# Patient Record
Sex: MALE | Race: WHITE | HISPANIC OR LATINO | URBAN - METROPOLITAN AREA
[De-identification: names, ages, dates, MRNs, and addresses within clinical notes are randomized per-mention and may not be internally consistent; named-entity substitution may affect disease eponyms.]

---

## 2018-09-09 ENCOUNTER — EMERGENCY (EMERGENCY)
Facility: HOSPITAL | Age: 23
LOS: 1 days | Discharge: HOME | End: 2018-09-09
Attending: EMERGENCY MEDICINE | Admitting: EMERGENCY MEDICINE
Payer: COMMERCIAL

## 2018-09-09 VITALS
RESPIRATION RATE: 18 BRPM | OXYGEN SATURATION: 98 % | HEART RATE: 70 BPM | HEIGHT: 65 IN | TEMPERATURE: 99 F | DIASTOLIC BLOOD PRESSURE: 66 MMHG | WEIGHT: 195.11 LBS | SYSTOLIC BLOOD PRESSURE: 113 MMHG

## 2018-09-09 PROCEDURE — 99283 EMERGENCY DEPT VISIT LOW MDM: CPT

## 2018-09-09 PROCEDURE — 99284 EMERGENCY DEPT VISIT MOD MDM: CPT

## 2018-09-09 NOTE — ED STATDOCS - MEDICAL DECISION MAKING DETAILS
22 year old panic attack  family service league and community resources given   does not want to speak to BH does not want medication

## 2018-09-09 NOTE — ED ADULT NURSE NOTE - NSIMPLEMENTINTERV_GEN_ALL_ED
Implemented All Universal Safety Interventions:  [place] to call system. Call bell, personal items and telephone within reach. Instruct patient to call for assistance. Room bathroom lighting operational. Non-slip footwear when patient is off stretcher. Physically safe environment: no spills, clutter or unnecessary equipment. Stretcher in lowest position, wheels locked, appropriate side rails in place.

## 2018-09-09 NOTE — ED STATDOCS - OBJECTIVE STATEMENT
22 year old male no psychiatric hx, presenting with 2 episodes of panic attacks over the last 2 days. states that he has been extremely stressed about money lately. 5pm today while driving he state that he felt like a panic attack was coming on, started sweating, got very anxious, felt short of breath and pulled over. states that the episode lasted 20 mins. denies SI/HI. denies drinking smoking or illicit drug use. states that currently he is feeling a lot better than before but is still worried that he might have another attack. states that he has had palpitations in the past and has follwoed up with a cardiologist. denies current cp. sob. nausea, ha, blurry vision, abdominal pains

## 2018-09-09 NOTE — ED STATDOCS - ATTENDING CONTRIBUTION TO CARE
22 year old male no psychiatric hx, presenting with 2 episodes of panic attacks over the last 2 days. states that he has been extremely stressed about money lately. 5pm today while driving he state that he felt like a panic attack was coming on, started sweating, got very anxious, felt short of breath and pulled over. states that the episode lasted 20 mins. denies SI/HI. denies drinking smoking or illicit drug use.  pe nad  heent ncat neuro nonfocal psych ; no si, no hallucinations; clear speech; dc with family dx anxiety;

## 2018-09-09 NOTE — ED ADULT TRIAGE NOTE - CHIEF COMPLAINT QUOTE
Patient is awake and oriented times 3, complains of an episode of feeling anxious on his way to his grandmothers house, patient reports that he is slowly feeling better

## 2019-01-10 ENCOUNTER — EMERGENCY (EMERGENCY)
Facility: HOSPITAL | Age: 24
LOS: 1 days | Discharge: HOME | End: 2019-01-10
Attending: EMERGENCY MEDICINE
Payer: MEDICAID

## 2019-01-10 VITALS
HEART RATE: 78 BPM | DIASTOLIC BLOOD PRESSURE: 81 MMHG | RESPIRATION RATE: 18 BRPM | HEIGHT: 66 IN | TEMPERATURE: 98 F | WEIGHT: 195.11 LBS | OXYGEN SATURATION: 99 % | SYSTOLIC BLOOD PRESSURE: 132 MMHG

## 2019-01-10 VITALS
SYSTOLIC BLOOD PRESSURE: 128 MMHG | RESPIRATION RATE: 16 BRPM | DIASTOLIC BLOOD PRESSURE: 75 MMHG | OXYGEN SATURATION: 99 % | HEART RATE: 64 BPM

## 2019-01-10 LAB
ALBUMIN SERPL ELPH-MCNC: 4.6 G/DL — SIGNIFICANT CHANGE UP (ref 3.3–5.2)
ALP SERPL-CCNC: 86 U/L — SIGNIFICANT CHANGE UP (ref 40–120)
ALT FLD-CCNC: 17 U/L — SIGNIFICANT CHANGE UP
ANION GAP SERPL CALC-SCNC: 11 MMOL/L — SIGNIFICANT CHANGE UP (ref 5–17)
APTT BLD: 31 SEC — SIGNIFICANT CHANGE UP (ref 27.5–36.3)
AST SERPL-CCNC: 19 U/L — SIGNIFICANT CHANGE UP
BASOPHILS # BLD AUTO: 0 K/UL — SIGNIFICANT CHANGE UP (ref 0–0.2)
BASOPHILS NFR BLD AUTO: 0.3 % — SIGNIFICANT CHANGE UP (ref 0–2)
BILIRUB SERPL-MCNC: 0.6 MG/DL — SIGNIFICANT CHANGE UP (ref 0.4–2)
BUN SERPL-MCNC: 14 MG/DL — SIGNIFICANT CHANGE UP (ref 8–20)
CALCIUM SERPL-MCNC: 9.4 MG/DL — SIGNIFICANT CHANGE UP (ref 8.6–10.2)
CHLORIDE SERPL-SCNC: 99 MMOL/L — SIGNIFICANT CHANGE UP (ref 98–107)
CK MB CFR SERPL CALC: 2.7 NG/ML — SIGNIFICANT CHANGE UP (ref 0–6.7)
CK SERPL-CCNC: 159 U/L — SIGNIFICANT CHANGE UP (ref 30–200)
CO2 SERPL-SCNC: 25 MMOL/L — SIGNIFICANT CHANGE UP (ref 22–29)
CREAT SERPL-MCNC: 0.58 MG/DL — SIGNIFICANT CHANGE UP (ref 0.5–1.3)
EOSINOPHIL # BLD AUTO: 0.1 K/UL — SIGNIFICANT CHANGE UP (ref 0–0.5)
EOSINOPHIL NFR BLD AUTO: 1.3 % — SIGNIFICANT CHANGE UP (ref 0–5)
GLUCOSE SERPL-MCNC: 101 MG/DL — SIGNIFICANT CHANGE UP (ref 70–115)
HCT VFR BLD CALC: 44.2 % — SIGNIFICANT CHANGE UP (ref 42–52)
HGB BLD-MCNC: 14.8 G/DL — SIGNIFICANT CHANGE UP (ref 14–18)
INR BLD: 1.08 RATIO — SIGNIFICANT CHANGE UP (ref 0.88–1.16)
LIDOCAIN IGE QN: 16 U/L — LOW (ref 22–51)
LYMPHOCYTES # BLD AUTO: 2.2 K/UL — SIGNIFICANT CHANGE UP (ref 1–4.8)
LYMPHOCYTES # BLD AUTO: 25.2 % — SIGNIFICANT CHANGE UP (ref 20–55)
MCHC RBC-ENTMCNC: 27.4 PG — SIGNIFICANT CHANGE UP (ref 27–31)
MCHC RBC-ENTMCNC: 33.5 G/DL — SIGNIFICANT CHANGE UP (ref 32–36)
MCV RBC AUTO: 81.7 FL — SIGNIFICANT CHANGE UP (ref 80–94)
MONOCYTES # BLD AUTO: 0.7 K/UL — SIGNIFICANT CHANGE UP (ref 0–0.8)
MONOCYTES NFR BLD AUTO: 7.9 % — SIGNIFICANT CHANGE UP (ref 3–10)
NEUTROPHILS # BLD AUTO: 5.6 K/UL — SIGNIFICANT CHANGE UP (ref 1.8–8)
NEUTROPHILS NFR BLD AUTO: 64.7 % — SIGNIFICANT CHANGE UP (ref 37–73)
PLATELET # BLD AUTO: 356 K/UL — SIGNIFICANT CHANGE UP (ref 150–400)
POTASSIUM SERPL-MCNC: 4.1 MMOL/L — SIGNIFICANT CHANGE UP (ref 3.5–5.3)
POTASSIUM SERPL-SCNC: 4.1 MMOL/L — SIGNIFICANT CHANGE UP (ref 3.5–5.3)
PROT SERPL-MCNC: 7.8 G/DL — SIGNIFICANT CHANGE UP (ref 6.6–8.7)
PROTHROM AB SERPL-ACNC: 12.4 SEC — SIGNIFICANT CHANGE UP (ref 10–12.9)
RBC # BLD: 5.41 M/UL — SIGNIFICANT CHANGE UP (ref 4.6–6.2)
RBC # FLD: 13.3 % — SIGNIFICANT CHANGE UP (ref 11–15.6)
SODIUM SERPL-SCNC: 135 MMOL/L — SIGNIFICANT CHANGE UP (ref 135–145)
TROPONIN T SERPL-MCNC: <0.01 NG/ML — SIGNIFICANT CHANGE UP (ref 0–0.06)
TSH SERPL-MCNC: 2.41 UIU/ML — SIGNIFICANT CHANGE UP (ref 0.27–4.2)
WBC # BLD: 8.6 K/UL — SIGNIFICANT CHANGE UP (ref 4.8–10.8)
WBC # FLD AUTO: 8.6 K/UL — SIGNIFICANT CHANGE UP (ref 4.8–10.8)

## 2019-01-10 PROCEDURE — 71046 X-RAY EXAM CHEST 2 VIEWS: CPT

## 2019-01-10 PROCEDURE — 99284 EMERGENCY DEPT VISIT MOD MDM: CPT | Mod: 25

## 2019-01-10 PROCEDURE — 84484 ASSAY OF TROPONIN QUANT: CPT

## 2019-01-10 PROCEDURE — 82550 ASSAY OF CK (CPK): CPT

## 2019-01-10 PROCEDURE — 85027 COMPLETE CBC AUTOMATED: CPT

## 2019-01-10 PROCEDURE — 84443 ASSAY THYROID STIM HORMONE: CPT

## 2019-01-10 PROCEDURE — 99284 EMERGENCY DEPT VISIT MOD MDM: CPT

## 2019-01-10 PROCEDURE — 80053 COMPREHEN METABOLIC PANEL: CPT

## 2019-01-10 PROCEDURE — 82553 CREATINE MB FRACTION: CPT

## 2019-01-10 PROCEDURE — 96374 THER/PROPH/DIAG INJ IV PUSH: CPT

## 2019-01-10 PROCEDURE — 71046 X-RAY EXAM CHEST 2 VIEWS: CPT | Mod: 26

## 2019-01-10 PROCEDURE — 83690 ASSAY OF LIPASE: CPT

## 2019-01-10 PROCEDURE — 85730 THROMBOPLASTIN TIME PARTIAL: CPT

## 2019-01-10 PROCEDURE — 36415 COLL VENOUS BLD VENIPUNCTURE: CPT

## 2019-01-10 PROCEDURE — 85610 PROTHROMBIN TIME: CPT

## 2019-01-10 RX ORDER — ONDANSETRON 8 MG/1
4 TABLET, FILM COATED ORAL ONCE
Qty: 0 | Refills: 0 | Status: COMPLETED | OUTPATIENT
Start: 2019-01-10 | End: 2019-01-10

## 2019-01-10 RX ORDER — SODIUM CHLORIDE 9 MG/ML
1000 INJECTION INTRAMUSCULAR; INTRAVENOUS; SUBCUTANEOUS ONCE
Qty: 0 | Refills: 0 | Status: COMPLETED | OUTPATIENT
Start: 2019-01-10 | End: 2019-01-10

## 2019-01-10 RX ADMIN — ONDANSETRON 4 MILLIGRAM(S): 8 TABLET, FILM COATED ORAL at 14:37

## 2019-01-10 RX ADMIN — SODIUM CHLORIDE 1000 MILLILITER(S): 9 INJECTION INTRAMUSCULAR; INTRAVENOUS; SUBCUTANEOUS at 14:38

## 2019-01-10 NOTE — ED STATDOCS - PROGRESS NOTE DETAILS
NP NOTE:  HPI, ROS, PE of intake doctor reviewed and agree. W/U pending. NP NOTE:  Labs reviewed, unremarkable.  PVC's occasionally on monitor.  Will f/u with Dr. Star Branch.  Patient offered psych consult for anxiety, he  stated he would f/u with PCP and ask him.

## 2019-01-10 NOTE — ED STATDOCS - MEDICAL DECISION MAKING DETAILS
Will check basic lab work, CXR and if cleared will dc home with cardiology followup. Pt here with episode of palpitations/ body aches and nausea present upon awakening and since resolved.  Taking herbal supplement containing passionflower which has side effects listed including nausea/ palpitations listed .  Pt advised to halt supplementation.  EKG WNL. Will check basic lab work, including electrolytes, CXR, cardiac monitor,  and if cleared will dc home with cardiology followup.

## 2019-01-10 NOTE — ED ADULT NURSE NOTE - OBJECTIVE STATEMENT
pt awake, alert and oriented x3 c/o feelings of chest palpitations and anxiety.  Respirations even and unlabored, denies chest pain.  Abdomen soft, nontender, nondistended.  Skin warm and dry.  Moving all extremities well and with purpose.

## 2019-01-10 NOTE — ED STATDOCS - OBJECTIVE STATEMENT
22 y/o M with PMHx of anxiety presents to ED c/o palpitations onset today around 1130 today upon waking up with associated body aches. The episode lasted approximately 45 minutes and resolved shortly after coming to the ED and currently only feels nauseous. He mentions that last night he was experiencing  cough and nasal congestion. Has experienced palpitations in the past (2x) but was never evaluated by a doctor and past episodes were more severe that what he felt today. Admits to occasional marijuana use. Unsure if he has family hx of cardiac problems. Has been drinking Nervi-sharon BID (a natural product) for his anxiety. Denies fevers, chills or vomiting. Has been evaluated by a cardiologist 2 years ago and had a halter monitor test which was normal. No further acute complaints at this time.    Cardiologist: Dr. Star Branch 22 y/o M with PMHx of anxiety presents to ED c/o palpitations onset today around 1130 today upon waking up with associated body aches. The episode lasted approximately 45 minutes and resolved shortly after coming to the ED and currently only feels nauseous. He mentions that last night he was experiencing  cough and nasal congestion. Has experienced palpitations in the past (2x) but was never evaluated by a doctor and past episodes were more severe that what he felt today. Admits to occasional marijuana use. Unsure if he has family hx of cardiac problems. Has been drinking Nervi-sharon BID (a natural product) for his anxiety. Denies fevers, chills or vomiting. Has been evaluated by a cardiologist 2 years ago for these same symptoms and had a halter monitor test which was normal. No further acute complaints at this time. Pt does not want to see a psychiatrist regarding anxiety because he states that if he were given a diagnosis he wouldn't believe it anyway.     Cardiologist: Dr. Star Branch

## 2019-01-10 NOTE — ED STATDOCS - ATTENDING CONTRIBUTION TO CARE
I, Jany Elias, performed the initial face to face bedside interview with this patient regarding history of present illness, review of symptoms and relevant past medical, social and family history.  I completed an independent physical examination.  I was the initial provider who evaluated this patient. I have signed out the follow up of any pending tests (i.e. labs, radiological studies) to the ACP.  I have communicated the patient’s plan of care and disposition with the ACP.  The history, relevant review of systems, past medical and surgical history, medical decision making, and physical examination was documented by the scribe in my presence and I attest to the accuracy of the documentation.

## 2019-01-10 NOTE — ED ADULT TRIAGE NOTE - CHIEF COMPLAINT QUOTE
pt states he had sudden onset difficulty breathing and felt like his heart was racing.  currently symptoms have resolved.

## 2019-01-19 ENCOUNTER — EMERGENCY (EMERGENCY)
Facility: HOSPITAL | Age: 24
LOS: 1 days | Discharge: HOME | End: 2019-01-19
Attending: EMERGENCY MEDICINE
Payer: MEDICAID

## 2019-01-19 VITALS
OXYGEN SATURATION: 99 % | WEIGHT: 194.01 LBS | HEART RATE: 75 BPM | RESPIRATION RATE: 18 BRPM | TEMPERATURE: 99 F | HEIGHT: 66 IN | DIASTOLIC BLOOD PRESSURE: 73 MMHG | SYSTOLIC BLOOD PRESSURE: 130 MMHG

## 2019-01-19 PROCEDURE — 99282 EMERGENCY DEPT VISIT SF MDM: CPT

## 2019-01-19 PROCEDURE — 99283 EMERGENCY DEPT VISIT LOW MDM: CPT | Mod: 25

## 2019-01-20 NOTE — ED PROVIDER NOTE - OBJECTIVE STATEMENT
22 y/o male hx anxiety c/o some pain in distal left arm that turned to numbness. States has had intermittent pains on left side of body for a while, coinciding with start of his anxiety. Developed paresthesia in hand only over past day. Seen here last week for palpitations, neg labs/xr. Pt states no stressors at home but has just felt a lot more anxious/with panic attacks over past months. No headache, f/c, cp, sob, abd pain or other symptoms. Pt also reports small bumps under armpit on left for 1 month, non tender, no f/c.     ROS: No fever/chills. No eye pain/changes in vision, No ear pain/sore throat/dysphagia, No chest pain/palpitations. No SOB/cough/. No abdominal pain, N/V/D, no black/bloody bm. No dysuria/frequency/discharge, No headache. No Dizziness.    No rashes or breaks in skin.

## 2019-01-20 NOTE — ED PROVIDER NOTE - PHYSICAL EXAMINATION
Gen: No acute distress, non toxic, anxious/stressed appearing.   HEENT: Mucous membranes moist, pink conjunctivae, EOMI  CV: RRR, nl s1/s2.  Resp: CTAB, normal rate and effort  GI: Abdomen soft, NT, ND. No rebound, no guarding  : No CVAT  Neuro: A&O x 3, moving all 4 extremities. cn 2-12 wnl. nl sensation entire b/l UE and LE. 5/5 strength at each joint. nl thumb opposition, finger adduction, extension. no sandra tenderness or swelling.   MSK: No spine or joint tenderness to palpation  Skin: No rashes. intact and perfused. 2 ~5mm non tender palpable nodules left axilla, no overlying skin changes.

## 2019-01-20 NOTE — ED PROVIDER NOTE - MEDICAL DECISION MAKING DETAILS
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 neuro completely wnl. ?small non tender lymph node in axilla. no infectious, nl labs last week. has pcp. encouraged BCT/therapy. return precautions.

## 2019-02-20 ENCOUNTER — EMERGENCY (EMERGENCY)
Facility: HOSPITAL | Age: 24
LOS: 1 days | Discharge: HOME | End: 2019-02-20
Attending: EMERGENCY MEDICINE
Payer: MEDICAID

## 2019-02-20 VITALS
TEMPERATURE: 98 F | HEART RATE: 72 BPM | HEIGHT: 65 IN | RESPIRATION RATE: 18 BRPM | DIASTOLIC BLOOD PRESSURE: 100 MMHG | SYSTOLIC BLOOD PRESSURE: 145 MMHG | OXYGEN SATURATION: 99 % | WEIGHT: 188.94 LBS

## 2019-02-20 PROCEDURE — 99283 EMERGENCY DEPT VISIT LOW MDM: CPT | Mod: 25

## 2019-02-20 PROCEDURE — 99282 EMERGENCY DEPT VISIT SF MDM: CPT

## 2019-02-20 RX ORDER — DEXAMETHASONE 0.5 MG/5ML
10 ELIXIR ORAL ONCE
Qty: 0 | Refills: 0 | Status: DISCONTINUED | OUTPATIENT
Start: 2019-02-20 | End: 2019-02-25

## 2019-02-20 NOTE — ED PROVIDER NOTE - OBJECTIVE STATEMENT
pt presents with odynophagia x 1 week no drooling no trisums and intermittent left sided achy chest pain no prior tx. . denies fever. denies HA or neck pain. no chest pain or sob. no abd pain. no n/v/d. no urinary f/u/d. no back pain. no motor or sensory deficits. denies illicit drug use. no recent travel. no rash. no other acute issues symptoms or concerns

## 2019-02-20 NOTE — ED PROVIDER NOTE - ENMT, MLM
Airway patent, Nasal mucosa clear. Mouth with normal mucosa. Throat has no vesicles, no oropharyngeal exudates and uvula is midline. no pta

## 2019-03-09 ENCOUNTER — EMERGENCY (EMERGENCY)
Facility: HOSPITAL | Age: 24
LOS: 1 days | Discharge: HOME | End: 2019-03-09
Attending: EMERGENCY MEDICINE
Payer: MEDICAID

## 2019-03-09 VITALS
RESPIRATION RATE: 20 BRPM | OXYGEN SATURATION: 97 % | HEART RATE: 65 BPM | HEIGHT: 66 IN | TEMPERATURE: 98 F | DIASTOLIC BLOOD PRESSURE: 68 MMHG | SYSTOLIC BLOOD PRESSURE: 133 MMHG | WEIGHT: 182.1 LBS

## 2019-03-09 PROCEDURE — 99282 EMERGENCY DEPT VISIT SF MDM: CPT | Mod: 25

## 2019-03-09 PROCEDURE — 99283 EMERGENCY DEPT VISIT LOW MDM: CPT | Mod: 25

## 2019-03-09 PROCEDURE — 69210 REMOVE IMPACTED EAR WAX UNI: CPT

## 2019-03-09 PROCEDURE — 69210 REMOVE IMPACTED EAR WAX UNI: CPT | Mod: LT,RT

## 2019-03-09 NOTE — ED STATDOCS - CLINICAL SUMMARY MEDICAL DECISION MAKING FREE TEXT BOX
pt with cerumen impaction, disimpaction, hearing resolved, pain relieved, pt DCed and pt advised to follow up with audiologist or ENT.

## 2019-03-09 NOTE — ED STATDOCS - ENMT, MLM
Nasal mucosa clear.  Mouth with normal mucosa.  Impacted cerumen b/l, L greater than R.  Throat has no vesicles, no oropharyngeal exudates and uvula is midline.

## 2019-03-09 NOTE — ED STATDOCS - OBJECTIVE STATEMENT
22 y/o M pt presents to the ED c/o persistent L ear pain beginning 3 days ago.  Pt put ear drops in both ears, notes worsening L ear pain and decreased hearing to his R ear.  He notes associated nausea and HA.  Denies fever, chills, vomiting, CP, SOB.  No further acute complaints at this time. 24 y/o M pt presents to the ED c/o persistent L ear pain beginning 3 days ago.  Pt put ear drops in both ears, notes worsening L ear pain and decreased hearing to his b/l ears.  He notes associated nausea and HA.  Pt states this has happened to him before, he has had to have his ears flushed and cleaned out.  Denies fever, chills, vomiting, CP, SOB.  No further acute complaints at this time.

## 2020-09-16 NOTE — ED ADULT NURSE NOTE - BREATH SOUNDS, MLM
Para completed, pt tolerated well. No apparent distress noted. 5.6 Liters removed from left abd, mepore applied CDI. Labs collected and sent. Albumin 150 ml given per protocol.  Discharge instructions reviewed and acknowledged. Pt discharged via [employer]cgair and private vehicle.     
Pt to rm 125. M [place] NP to bedside. Yueh Catheter placed to LLW with use of ultrasound guidance. No complaints or signs of distress. Will continue to monitor.    
Clear
62.7

## 2020-11-03 ENCOUNTER — EMERGENCY (EMERGENCY)
Facility: HOSPITAL | Age: 25
LOS: 1 days | Discharge: HOME | End: 2020-11-03
Attending: EMERGENCY MEDICINE
Payer: MEDICAID

## 2020-11-03 VITALS
OXYGEN SATURATION: 98 % | RESPIRATION RATE: 18 BRPM | SYSTOLIC BLOOD PRESSURE: 126 MMHG | TEMPERATURE: 99 F | HEART RATE: 77 BPM | HEIGHT: 66 IN | DIASTOLIC BLOOD PRESSURE: 76 MMHG | WEIGHT: 199.96 LBS

## 2020-11-03 PROCEDURE — 99283 EMERGENCY DEPT VISIT LOW MDM: CPT

## 2020-11-03 PROCEDURE — 73630 X-RAY EXAM OF FOOT: CPT

## 2020-11-03 PROCEDURE — 73630 X-RAY EXAM OF FOOT: CPT | Mod: 26,RT

## 2020-11-03 NOTE — ED PROVIDER NOTE - PATIENT PORTAL LINK FT
You can access the FollowMyHealth Patient Portal offered by [facility] by registering at the following website: [email or web address]. By joining [employer]’s FollowMyHealth portal, you will also be able to view your health information using other applications (apps) compatible with our system.

## 2020-11-03 NOTE — ED PROVIDER NOTE - PROGRESS NOTE DETAILS
CHANG Ambrocio NOTE: Pt evaluated at bedside. Pt is 26 y/o M with right lateral foot pain after twisting injury occurring 2 days ago while at gym. Denies head trauma or fall down.   PE: non toxic, RRR, lungs CTA, right lateral midfoot ttp, no malleolus ttp, NVI, able to bear wt, cap refill less than 2 seconds, no ttp knee.   Pt evaluated prior by intake physician. Otherwise HPI/PE/ROS as noted above. Will follow up plan per intake physician.

## 2020-11-03 NOTE — ED PROVIDER NOTE - CARE PROVIDER_API CALL
Francesca De La Torre  PODIATRIC MEDICINE AND SURGERY  [address] [place] [address]  Phone: [phone]  Fax: [phone]  Follow Up Time:

## 2020-11-30 ENCOUNTER — EMERGENCY (EMERGENCY)
Facility: HOSPITAL | Age: 25
LOS: 1 days | Discharge: HOME | End: 2020-11-30
Attending: EMERGENCY MEDICINE
Payer: MEDICAID

## 2020-11-30 VITALS
TEMPERATURE: 99 F | WEIGHT: 207.01 LBS | HEART RATE: 76 BPM | RESPIRATION RATE: 18 BRPM | DIASTOLIC BLOOD PRESSURE: 77 MMHG | SYSTOLIC BLOOD PRESSURE: 115 MMHG | OXYGEN SATURATION: 98 % | HEIGHT: 66 IN

## 2020-11-30 PROCEDURE — 99283 EMERGENCY DEPT VISIT LOW MDM: CPT | Mod: 25

## 2020-11-30 PROCEDURE — 69210 REMOVE IMPACTED EAR WAX UNI: CPT | Mod: LT,RT

## 2020-11-30 PROCEDURE — 69210 REMOVE IMPACTED EAR WAX UNI: CPT

## 2020-11-30 RX ORDER — CIPROFLOXACIN 6 MG/ML
1 SUSPENSION INTRATYMPANIC
Qty: 30 | Refills: 0
Start: 2020-11-30 | End: 2020-12-09

## 2020-11-30 NOTE — ED STATDOCS - PROGRESS NOTE DETAILS
Ears irrigated by CHANG Negron, stable for dc home with ENT f/u. Maritza Rain DO Irrigated ears, wax was removed, TM still partially occluded with was. Pt informed and understands to follow up with ENT

## 2020-11-30 NOTE — ED STATDOCS - ATTENDING CONTRIBUTION TO CARE
I, Maritza Rain, performed a face to face bedside interview with this patient regarding history of present illness, review of symptoms and relevant past medical, social and family history.  I completed an independent physical examination. Medical decision making, follow-up on ordered tests (ie labs, radiologic studies) and re-evaluation of the patient's status has been communicated to the ACP.  Disposition of the patient will be based on test outcome and response to ED interventions.

## 2020-11-30 NOTE — ED STATDOCS - CARE PROVIDER_API CALL
Lalo Hardwick  OTOLARYNGOLOGY  [address] [place] [address], [place] [address]  Phone: [phone]  Fax: [phone]  Follow Up Time: 4-6 Days

## 2020-11-30 NOTE — ED STATDOCS - PHYSICAL EXAMINATION
Gen: NAD, AOx3  Head: NCAT  HEENT: L auditory canal with cerumen impaction, R auditory canal erythematous, some cerumen present, R TM intact, no TTP over mastaoid process on right, oropharynx clear without exudate or erythema, no nuchal rigidity, normal voice, normal conjunctiva  Lung: no respiratory distress  CV: Normal perfusion  MSK: No edema, no visible deformities, full range of motion in all 4 extremities  Neuro:No focal neurologic deficits  Skin: No rash   Psych: normal affect

## 2020-11-30 NOTE — ED STATDOCS - CLINICAL SUMMARY MEDICAL DECISION MAKING FREE TEXT BOX
Pt with R otitis externa, cerumen impaction- will prescribe topical otic antibiotics, dc home with ENT f/u.

## 2020-11-30 NOTE — ED STATDOCS - OBJECTIVE STATEMENT
26yo male with no PMH presenting with R ear fullness x 1 day, R ear pain x today. Patient states he thinks his ears are clogged, has been picking at ear with finger and put peroxide in ear today. No hearing loss. No fevers/chills. Denies placing any other FB to R ear. +Sore throat, no nasal congestion, no cough.

## 2020-11-30 NOTE — ED STATDOCS - NSFOLLOWUPINSTRUCTIONS_ED_ALL_ED_FT
1. Follow up with an ENT within 2-3days for reevaluation.  2.  Return to the Emergency Department for worsening, progressive or any other concerning symptoms.   3.  Complete your entire course of antibiotics as prescribed. Do not stop taking antibiotics even if your symptoms improve.   4.  Please take Motrin 600mg by mouth every 6 hours as needed for pain. Please take this medication with food.       Otitis Externa    AMBULATORY CARE:    Otitis externa, or swimmer's ear, is an infection in the outer ear canal. This canal goes from the outside of the ear to the eardrum.     Ear Anatomy         Common signs and symptoms include the following:   •Ear pain      •Outer ear canal is red and swollen      •Clear fluid or pus is leaking out of your ear      •Outer ear canal is itchy and you see a rash      •Trouble hearing because your ear is plugged      •Feel a bump in your ear canal, called a polyp      •Flakes of skin fall from your ear      Seek care immediately if:   •You have severe ear pain.      •You are suddenly unable to hear at all.      •You have new swelling in your face, behind your ears, or in your neck.      •You suddenly cannot move part of your face.      •Your face suddenly feels numb.      Contact your healthcare provider if:   •You have a fever.      •Your signs and symptoms do not get better after 2 days of treatment.       •Your signs and symptoms go away for a time, but then come back.       •You have questions or concerns about your condition or care.      Treatment for otitis externa may include any of the following:  •NSAIDs, such as ibuprofen, help decrease swelling, pain, and fever. This medicine is available with or without a doctor's order. NSAIDs can cause stomach bleeding or kidney problems in certain people. If you take blood thinner medicine, always ask if NSAIDs are safe for you. Always read the medicine label and follow directions. Do not give these medicines to children under 6 months of age without direction from your child's healthcare provider.      •Acetaminophen decreases pain and fever. It is available without a doctor's order. Ask how much to take and how often to take it. Follow directions. Acetaminophen can cause liver damage if not taken correctly.      •Ear drops that contain an antibiotic may be given. The antibiotic helps treat a bacterial infection. You may also be given steroid medicine. The steroid helps decrease redness, swelling, and pain.       •Ear wicking removes fluid or wax from your outer ear canal. Healthcare providers may insert a small tube, called a wick, into your ear to help drain fluid. A wick also may be used to put medicine into your ear canal if the canal is blocked.      Follow the steps below to use eardrops:   •Lie down on your side with your infected ear facing up.      •Carefully drip the correct number of eardrops into your ear. Have another person help you if possible.      •Gently move the outside part of your ear back and forth to help the medicine reach your ear canal.       •Stay lying down in the same position (with your ear facing up) for 3 to 5 minutes.       Prevent otitis externa:   •Do not put cotton swabs or foreign objects in your ears.      •Wrap a clean moist washcloth around your finger, and use it to clean your outer ear and remove extra ear wax.       •Use ear plugs when you swim. Dry your outer ears completely after you swim or bathe.      Follow up with your healthcare provider as directed: Write down your questions so you remember to ask them during your visits.

## 2020-11-30 NOTE — ED STATDOCS - PATIENT PORTAL LINK FT
You can access the FollowMyHealth Patient Portal offered by [facility] by registering at the following website: [email or web address]. By joining [employer]’s FollowMyHealth portal, you will also be able to view your health information using other applications (apps) compatible with our system.

## 2020-12-04 NOTE — ED PROCEDURE NOTE - PROCEDURE ADDITIONAL DETAILS
used a large syringe with 18 guage catheter, irrigated with warm saline. Wax was successfully removed. Pt tolerated well. No dizziness or complaints used a large syringe with 18 guage catheter, irrigated with warm saline. Wax was successfully removed. Pt tolerated well. No dizziness or complaints    bilateral ears irrigated

## 2020-12-28 ENCOUNTER — EMERGENCY (EMERGENCY)
Facility: HOSPITAL | Age: 25
LOS: 1 days | Discharge: HOME | End: 2020-12-28
Attending: EMERGENCY MEDICINE
Payer: MEDICAID

## 2020-12-28 VITALS
RESPIRATION RATE: 18 BRPM | SYSTOLIC BLOOD PRESSURE: 132 MMHG | DIASTOLIC BLOOD PRESSURE: 87 MMHG | HEART RATE: 80 BPM | WEIGHT: 203.05 LBS | OXYGEN SATURATION: 97 % | TEMPERATURE: 98 F | HEIGHT: 66 IN

## 2020-12-28 PROCEDURE — 99283 EMERGENCY DEPT VISIT LOW MDM: CPT

## 2020-12-28 RX ADMIN — Medication 1 TABLET(S): at 07:52

## 2020-12-28 NOTE — ED PROVIDER NOTE - NSFOLLOWUPINSTRUCTIONS_ED_ALL_ED_FT
return to ED with any vomiting, visual changes, worsening symptoms or any new concerns.     Apply warm compresses to area 3 times a day. Do not pick at area. Clean area daily with soap and water.     Take Augmentin 875mg twice a day x 7 days.     Follow up with [facility] clinic within  48 hours

## 2020-12-28 NOTE — ED PROVIDER NOTE - ATTENDING CONTRIBUTION TO CARE
Sola BLACKWELL- 26 y/o M with no med problems p/w pimple of tip of nose and under for 3 days and he picked on it and it got worse and painful. No nasal drainage. Pt also has throat pain and slight headache but no nausea, vomiting. Pt has been eating, drinking, urinating, defecating wnl. No changes in vision or rash anywhere else    Pt is alert, well appearing male, s1s2 normal reg, b/l clear breath sounds, abd soft, nt, nd, neuro exam aox3, cn 2-12 intact, no focal deficits, oral exam normal, no exudates or redness of throat, tip of nose is red with one pimple but no fluctuance, mild tenderness, nor interseptal pain or swelling, neuro exam aox3, cn 2-12 intact, no focal deficits, neck soft, supple, skin warm, dry, good turgor    will treat with Augmentin, advised ot apply warm compress to nose 2-3 times a day and take steam inhalation

## 2020-12-28 NOTE — ED PROVIDER NOTE - PATIENT PORTAL LINK FT
You can access the FollowMyHealth Patient Portal offered by [facility] by registering at the following website: [email or web address]. By joining [employer]’s FollowMyHealth portal, you will also be able to view your health information using other applications (apps) compatible with our system.

## 2020-12-28 NOTE — ED PROVIDER NOTE - OBJECTIVE STATEMENT
24 y/o M PT with no SPMHx presents to the ED with complaint of pain and redness to nose. Pt states 5 days ago it started out as a pimple and he just rubbing it and it worsened. Denies fever, nausea, diarrhea. States he woke up with nasal congestion and sore throat.

## 2020-12-28 NOTE — ED PROVIDER NOTE - CLINICAL SUMMARY MEDICAL DECISION MAKING FREE TEXT BOX
26 y/o m with scab redness and tenderness to nasal tip. No septum involvement. dc home with strict ED return precautions. Follow up with [facility] clinic. rx Augmentin

## 2020-12-28 NOTE — ED PROVIDER NOTE - PHYSICAL EXAMINATION
scab with surrounding redness to tip of nose. Nasal septum non tender no swelling or masses in nares.

## 2021-01-05 ENCOUNTER — EMERGENCY (EMERGENCY)
Facility: HOSPITAL | Age: 26
LOS: 1 days | Discharge: HOME | End: 2021-01-05
Attending: EMERGENCY MEDICINE
Payer: MEDICAID

## 2021-01-05 VITALS
RESPIRATION RATE: 18 BRPM | WEIGHT: 205.03 LBS | OXYGEN SATURATION: 97 % | DIASTOLIC BLOOD PRESSURE: 100 MMHG | TEMPERATURE: 100 F | HEART RATE: 95 BPM | HEIGHT: 66 IN | SYSTOLIC BLOOD PRESSURE: 145 MMHG

## 2021-01-05 PROCEDURE — 99283 EMERGENCY DEPT VISIT LOW MDM: CPT

## 2021-01-05 RX ADMIN — Medication 100 MILLIGRAM(S): at 22:02

## 2021-01-05 NOTE — ED ADULT TRIAGE NOTE - CHIEF COMPLAINT QUOTE
Seen in ED for infection in/on nose 12/28/2020.  didn't finish antibiotic.  stopped 3 days ago.   Infection worse.

## 2021-01-05 NOTE — ED PROVIDER NOTE - CARE PROVIDER_API CALL
Lalo Hardwick)  Otolaryngology  [address] [place] [address], [place] [address]  Phone: [phone]  Fax: [phone]  Follow Up Time:

## 2021-01-05 NOTE — ED PROVIDER NOTE - ATTENDING CONTRIBUTION TO CARE
Lisseth: I performed a face to face bedside interview with patient regarding history of present illness, review of symptoms and past medical history. I completed an independent physical exam.  I have discussed patient's plan of care with advanced care provider.   I agree with note as stated above including HISTORY OF PRESENT ILLNESS, HIV, PAST MEDICAL/SURGICAL/FAMILY/SOCIAL HISTORY, ALLERGIES AND HOME MEDICATIONS, REVIEW OF SYSTEMS, PHYSICAL EXAM, MEDICAL DECISION MAKING and any PROGRESS NOTES during the time I functioned as the attending physician for this patient  unless otherwise noted. My brief assessment is as follows: Pt with cellulitis to nose earlier this week, got better with 2-3 days of augmentin so he stopped taking abx, now with worsened nasal redness. Exam notable for erythema/tenderness. No septal involvement. Plan for warm compresses, abx, ENT follow up.

## 2021-01-05 NOTE — ED PROVIDER NOTE - PATIENT PORTAL LINK FT
You can access the FollowMyHealth Patient Portal offered by [facility] by registering at the following website: [email or web address]. By joining [email or web address]’s FollowMyHealth portal, you will also be able to view your health information using other applications (apps) compatible with our system.

## 2021-01-05 NOTE — ED PROVIDER NOTE - PHYSICAL EXAMINATION
Const: Awake, alert and oriented. In no acute distress. Well appearing.  HEENT: NC/AT. Moist mucous membranes. ears tms clear bilaterally throat: pharynx clear uvula is midline nose: tip of nose erythema/tenderness on palpation cellulitis, no collection noted, no puruent drainage no septum involvement   Eyes: No scleral icterus. EOMI.  Neck:. Soft and supple. Full ROM without pain.  Cardiac: +S1/S2. No murmurs. Peripheral pulses 2+ and symmetric. No LE edema.  Resp: Speaking in full sentences. No evidence of respiratory distress. No wheezes, rales or rhonchi.  Abd: Soft, non-tender, non-distended. Normal bowel sounds in all 4 quadrants. No guarding or rebound.  Back: Spine midline and non-tender. No CVAT.  Skin: No rashes, abrasions or lacerations.  Lymph: No cervical lymphadenopathy.  Neuro: Awake, alert & oriented x 3. CN II-XII intact, finger to nose intact neurovascualry intact muscle strength 5/5 x 4 extremities gait without ataxia

## 2021-01-05 NOTE — ED PROVIDER NOTE - OBJECTIVE STATEMENT
pt is a 26 y/o male presenting to the ed for evaluation. pt states he was seen at the ed on 12/28/2020. pt complaining of pain and redness to the nose, states it started out as a pimple then worsened. pt was prescribed augmentin last time at the ed but did not finish the course of the antibiotics. pt states the infection improved but after stopping the antibiotics worsened again. pt denies cp, sob fevers cough abd pain nausea vomiting back pain

## 2021-01-25 ENCOUNTER — EMERGENCY (EMERGENCY)
Facility: HOSPITAL | Age: 26
LOS: 1 days | Discharge: HOME | End: 2021-01-25
Attending: EMERGENCY MEDICINE
Payer: MEDICAID

## 2021-01-25 VITALS
TEMPERATURE: 99 F | HEART RATE: 100 BPM | HEIGHT: 66 IN | OXYGEN SATURATION: 97 % | WEIGHT: 205.03 LBS | SYSTOLIC BLOOD PRESSURE: 133 MMHG | RESPIRATION RATE: 20 BRPM | DIASTOLIC BLOOD PRESSURE: 83 MMHG

## 2021-01-25 PROCEDURE — 99283 EMERGENCY DEPT VISIT LOW MDM: CPT

## 2021-01-25 NOTE — ED STATDOCS - CLINICAL SUMMARY MEDICAL DECISION MAKING FREE TEXT BOX
Patient with significant anxiety about nasal infection with multiple visits to [facility]. Patient physical exam unremarkable for infection. 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 Patient will have his prescription for augmentin refilled. To provide with bacitracin ointment for nares. Patient counseled on excessive googling of symptoms. Patient reassured that he is well appearing and without significant medical conditions. To be discharged at this time.

## 2021-01-25 NOTE — ED STATDOCS - OBJECTIVE STATEMENT
Patient is a 26yo M presenting with concern for infection of his nose. He reports that he came to [facility] two times in the past month for nose infection. He states last time he was here was prescribed antibiotics but did not take the entire course. He states he has been picking his nose even though he was told not to. He also reports googling his symptoms and becoming very concerned that he may develop a brain infection. Patient states he has some concerning lymph nodes as well. He denies any fevers, chills, headache, nausea, vomiting, difficulty breathing, coughing, worsening pain. Patient is a 24yo M presenting with concern for infection of his nose. He reports that he came to [facility] two times in the past month for nose infection. He states last time he was here was prescribed antibiotics but did not take the entire course. He states he has been picking his nose even though he was told not to. He also reports googling his symptoms and becoming very concerned that he may develop a brain infection. He denies any fevers, chills, headache, nausea, vomiting, difficulty breathing, coughing, worsening pain.

## 2021-01-25 NOTE — ED STATDOCS - CARE PLAN
Principal Discharge DX:	Nose pain  Secondary Diagnosis:	Concern about infectious disease without diagnosis

## 2021-01-25 NOTE — ED STATDOCS - DISCHARGE DATE
"    [facility] Medicine Services  HISTORY AND PHYSICAL    Primary Care Physician: Peter Rdz MD   Cardiologist: Dr. Yomi Ramirez (prior Dr. Anais Morrison in [place])  Pulmonologist: none  Nephrologist: none    Subjective     Chief Complaint: shortness of breath    History of Present Illness:     Mrs. Roldan presented to [facility] today for shortness of breath. Onset 3 days ago. Sudden onset. Moderate. Constant. Nothing makes it better or worse. Also c/o of associated fever, congestion, cough, and neck/chest pain. She thinks it was triggered by taking her elderly neighbor out 4 days ago when she went to pay her bills and hair and clothing got wet. She states her throat felt like \"shredded\" meat when she was watching the [place] game and also had sneezing and itching eyes but did not improvement with benadryl. She reports she feels so congested she cannot lay back in her recliner or bed x3 nights which has triggered her appearance to the ED.    Mrs. Roldan is being admitted to [facility] for associated CHF exacerbation and COPD exacerbation / AECB.    Review of Systems   Constitutional: Positive for activity change, appetite change, chills, fatigue and fever. Negative for diaphoresis.        She reports her baseline temp is 97.5 and got up to 100 yesterday.   HENT: Positive for congestion, rhinorrhea and sore throat. Negative for ear pain and trouble swallowing.         Reports her ears are popping occasionally or sounding like soda fizzing in her left. Feels drainage down her neck.   Eyes: Positive for discharge and itching.   Respiratory: Positive for cough, shortness of breath and wheezing. Negative for apnea.         Reports acute SOA at rest last few days, better since in ED and just with activity now. Reports cannot get cough to produce, stuck in throat/chest. Reports chest wall discomfort/pressure with deep coughing.   Cardiovascular: Positive for palpitations. Negative for chest " 25-Jan-2021 "pain and leg swelling.        Reports has noticed a few palpitations without good sleep.   Gastrointestinal: Positive for diarrhea and nausea. Negative for abdominal pain, constipation and vomiting.        Diarrhea x2 days - reports she is sensitive to this with any OTC meds and was taking benadryl recently, hasn't had diarrhea today. Nausea mainly with excessive coughing.   Genitourinary: Negative for difficulty urinating and dysuria.        Reports urine looks \"chalky\".   Musculoskeletal: Positive for arthralgias. Negative for joint swelling.        On chronic plaquenil. Reports her pain often moves around from hands to feet etc.   Skin: Negative for rash and wound.   Allergic/Immunologic: Positive for environmental allergies and immunocompromised state.   Neurological: Positive for weakness. Negative for dizziness and syncope.        Reports last vertigo about a year ago.   Psychiatric/Behavioral: Negative for agitation, behavioral problems, confusion and hallucinations. The patient is nervous/anxious.         Denies memory loss.      Otherwise complete ROS performed and negative except as mentioned in the HPI.    Past Medical History:   Diagnosis Date   • Acute respiratory failure with hypoxia 9/5/2017   • Allergic rhinitis 08/01/2014   • Anemia    • Anxiety    • Arthritis    • CAD (coronary artery disease)    • CHF (congestive heart failure)    • CKD (chronic kidney disease)    • CKD (chronic kidney disease), stage IV 9/5/2017   • Colitis, Clostridium difficile    • COPD (chronic obstructive pulmonary disease)    • Coronary atherosclerosis    • Depression    • Emphysema of lung    • GERD (gastroesophageal reflux disease)    • Heart attack    • Heart murmur    • Hematoma of hip    • Hip fracture    • Hyperlipidemia    • Hypertension     benign essential   • Ischemic cardiomyopathy 08/01/2014    ef 29% s/p ICD   • Knee injury    • Lung disease    • Mitral valve disorder 03/28/2013   • Mitral valve insufficiency  "    s/p prosthetic ATS valve replacement   • Osteoporosis    • Postmenopausal     natural   • PVD (peripheral vascular disease)    • Pyelonephritis    • Renal failure    • Renal failure    • Renal failure, acute    • Syncope    • Systemic lupus erythematosus    • TIA (transient ischemic attack) 04/04/2012   • UTI (urinary tract infection)    • Valvular heart disease    • Wrist fracture        Past Surgical History:   Procedure Laterality Date   • CARDIAC DEFIBRILLATOR PLACEMENT     • CHOLECYSTECTOMY  2010   • CORONARY ARTERY BYPASS GRAFT  2011 4 aortic bypasses, abdominal aorta; 2011-mitral valve; 2010-triple bypass   • ENDOSCOPY  10/23/2014    Dr. Brand; retained food in stomach; he dilated her esophagus; 5-30-14 hiatus hernia, gastritis   • JOINT REPLACEMENT Right 06/25/2015    Dr. Fitzgerald; first surgery 1-29-14; redo 5-4-15   • MITRAL VALVE REPLACEMENT      MECHANICAL   • TOTAL HIP ARTHROPLASTY Right     3 times within 8 months       Family History   Problem Relation Age of Onset   • Arthritis Mother      rheumatoid   • Heart attack Father    • Alcohol abuse Brother    • Heart disease Other      uncle   • Diabetes Other      uncle-type 2   • Hypertension Other      uncle   • Stroke Other      uncle   • Cancer Other      grandfather-lung    • Heart disease Maternal Uncle    • Lung cancer Paternal Grandfather        Social History     Social History   • Marital status:      Spouse name: N/A   • Number of children: N/A   • Years of education: N/A     Occupational History   • Not on file.     Social History Main Topics   • Smoking status: Current Every Day Smoker     Packs/day: 0.50     Types: Cigarettes   • Smokeless tobacco: Not on file      Comment: Down to 0.5 PPD from 1 PPD now consistently. Started as a teenager.   • Alcohol use Yes      Comment: about once a week, reports she is a sports fan and rarely drinks more up to 2-3 drinks   • Drug use: No   • Sexual activity: Defer     Other Topics Concern    • Not on file     Social History Narrative    Ms. Roldan is a 58 year old white female. Pt recently  after 30 years. Just moved from [facility] into her son's home in [place].        Medications:    Lab Results (last 24 hours)     Procedure Component Value Units Date/Time    Comprehensive Metabolic Panel [[id number]]  (Abnormal) Collected:  09/05/17 1816    Specimen:  Blood Updated:  09/05/17 1849     Glucose 100 mg/dL      BUN 40 (H) mg/dL      Creatinine 1.90 (H) mg/dL      Sodium 134 mmol/L      Potassium 4.2 mmol/L      Chloride 101 mmol/L      CO2 28.0 mmol/L      Calcium 9.9 mg/dL      Total Protein 8.3 (H) g/dL      Albumin 4.60 g/dL      ALT (SGPT) 15 U/L      AST (SGOT) 31 U/L      Alkaline Phosphatase 198 (H) U/L      Total Bilirubin 0.3 mg/dL      eGFR Non African Amer 27 (L) mL/min/1.73      Globulin 3.7 gm/dL      A/G Ratio 1.2 (L) g/dL      BUN/Creatinine Ratio 21.1     Anion Gap 5.0 mmol/L     Narrative:       National Kidney Foundation Guidelines    Stage     Description        GFR  1         Normal or High     90+  2         Mild decrease      60-89  3         Moderate decrease  30-59  4         Severe decrease    15-29  5         Kidney failure     <15    Protime-INR [[id number]]  (Abnormal) Collected:  09/05/17 1816    Specimen:  Blood Updated:  09/05/17 2011     Protime 59.6 (C) Seconds      INR 5.20    Narrative:       Therapeutic Ranges for INR: 2.0-3.0 (PT 20-30)                              2.5-3.5 (PT 25-34)    Magnesium [[id number]] Collected:  09/05/17 1816    Specimen:  Blood Updated:  09/05/17 2350    CBC & Differential [[id number]] Collected:  09/05/17 1817    Specimen:  Blood Updated:  09/05/17 2140    Narrative:       The following orders were created for panel order CBC & Differential.  Procedure                               Abnormality         Status                     ---------                               -----------         ------                     CBC Auto  Differential[[id number]]        Abnormal            Final result                 Please view results for these tests on the individual orders.    BNP [[id number]]  (Abnormal) Collected:  09/05/17 1817    Specimen:  Blood Updated:  09/05/17 1856     BNP 1514.0 (H) pg/mL     CBC Auto Differential [[id number]]  (Abnormal) Collected:  09/05/17 1817    Specimen:  Blood Updated:  09/05/17 2140     WBC 6.19 10*3/mm3      RBC 3.56 (L) 10*6/mm3      Hemoglobin 11.7 g/dL      Hematocrit 34.7 %      MCV 97.5 fL      MCH 32.9 (H) pg      MCHC 33.7 g/dL      RDW 12.9 %      RDW-SD 46.5 fl      MPV 11.4 fL      Platelets 130 (L) 10*3/mm3      Neutrophil % 80.1 (H) %      Lymphocyte % 15.5 (L) %      Monocyte % 3.7 %      Eosinophil % 0.5 %      Basophil % 0.0 %      Immature Grans % 0.2 %      Neutrophils, Absolute 4.96 10*3/mm3      Lymphocytes, Absolute 0.96 10*3/mm3      Monocytes, Absolute 0.23 10*3/mm3      Eosinophils, Absolute 0.03 10*3/mm3      Basophils, Absolute 0.00 10*3/mm3      Immature Grans, Absolute 0.01 10*3/mm3     Lactic Acid, Plasma [[id number]]  (Normal) Collected:  09/05/17 1817    Specimen:  Blood Updated:  09/05/17 1842     Lactate 0.8 mmol/L       Falsely depressed results may occur on samples drawn from patients receiving N-Acetylcysteine (NAC) or Metamizole.       POC Troponin, Rapid [[id number]]  (Normal) Collected:  09/05/17 1817    Specimen:  Blood Updated:  09/05/17 1835     Troponin I 0.04 ng/mL       Serial Number: [id number]:  [id number]       Blood Culture [[id number]] Collected:  09/05/17 2129    Specimen:  Blood from Hand, Right Updated:  09/05/17 2141    Blood Culture [[id number]] Collected:  09/05/17 2129    Specimen:  Blood from Arm, Right Updated:  09/05/17 2141    Urinalysis With / Culture If Indicated [[id number]]  (Abnormal) Collected:  09/05/17 2248    Specimen:  Urine from Urine, Clean Catch Updated:  09/05/17 2320     Color, UA Yellow     Appearance, UA Turbid (A)     pH, UA 5.5      Specific Gravity, UA 1.012     Glucose, UA Negative     Ketones, UA Negative     Bilirubin, UA Negative     Blood, UA Trace (A)     Protein,  mg/dL (2+) (A)     Leuk Esterase, UA Large (3+) (A)     Nitrite, UA Negative     Urobilinogen, UA 0.2 E.U./dL    Urine Drug Screen [[id number]]  (Normal) Collected:  09/05/17 2248    Specimen:  Urine from Urine, Clean Catch Updated:  09/05/17 2335     THC, Screen, Urine Negative     Phencyclidine (PCP), Urine Negative     Cocaine Screen, Urine Negative     Methamphetamine, Urine Negative     Opiate Screen Negative     Amphetamine Screen, Urine Negative     Benzodiazepine Screen, Urine Negative     Tricyclic Antidepressants Screen Negative     Methadone Screen, Urine Negative     Barbiturates Screen, Urine Negative     Oxycodone Screen, Urine Negative     Propoxyphene Screen Negative     Buprenorphine, Screen, Urine Negative    Narrative:       Cutoff For Drugs Screened:    Amphetamines               500 ng/ml  Barbiturates               200 ng/ml  Benzodiazepines            150 ng/ml  Cocaine                    150 ng/ml  Methadone                  200 ng/ml  Opiates                    100 ng/ml  Phencyclidine               25 ng/ml  THC                            50 ng/ml  Methamphetamine            500 ng/ml  Tricyclic Antidepressants  300 ng/ml  Oxycodone                  100 ng/ml  Propoxyphene               300 ng/ml  Buprenorphine               10 ng/ml    The normal value for all drugs tested is negative. This report includes unconfirmed screening results, with the cutoff values listed, to be used for medical treatment purposes only.  Unconfirmed results must not be used for non-medical purposes such as employment or legal testing.  Clinical consideration should be applied to any drug of abuse test, particularly when unconfirmed results are used.      Urinalysis, Microscopic Only [[id number]]  (Abnormal) Collected:  09/05/17 2248    Specimen:  Urine from Urine,  "Clean Catch Updated:  09/05/17 2328     RBC, UA 3-6 (A) /HPF      WBC, UA Too Numerous to Count (A) /HPF      Bacteria, UA 3+ (A) /HPF      Squamous Epithelial Cells, UA 0-2 /HPF      Hyaline Casts, UA 0-6 /LPF      Methodology Manual Light Microscopy    Urine Culture [[id number]] Collected:  09/05/17 2248    Specimen:  Urine from Urine, Clean Catch Updated:  09/05/17 2303        ECG  Vent. rate 85 BPM  OR interval 136 ms  QRS duration 98 ms  QT/QTc 390/464 ms  P-R-T axes 25 -25 153  Normal sinus rhythm  ST & T wave abnormality, consider lateral ischemia  Prolonged QT  Abnormal ECG  When compared with ECG of 03-MAR-2017 05:49,  T wave inversion more evident in Lateral leads     XR Chest, 2 Views     CLINICAL HISTORY:    58 years old, female; Pain and signs and symptoms; Shortness of breath; Chest   pain; Left-sided chest pain; Additional info: SOA. Pt has HX of left sided rib   FX, pt attributes left sided chest pain to old rib FX     TECHNIQUE:    Frontal and lateral views of the chest.     COMPARISON:    CR - XR CHEST 1 VW 3/3/2017 11:01:41 AM     FINDINGS:       Lungs:  Unremarkable.  No consolidation.       Pleural space:  Unremarkable.  No pneumothorax.       Heart:  Unremarkable.  No cardiomegaly.       Mediastinum:  Unremarkable.       Bones/joints:  Status post sternotomy. Cardiac valve replacement.       Other findings:  Left-sided pacing device in place.     IMPRESSION:       No acute findings.    Allergies:  Allergies   Allergen Reactions   • Morphine And Related GI Intolerance and Irritability   • Sulfa Antibiotics          Objective     Physical Exam:  Vital Signs: /68  Pulse 88  Temp 98 °F (36.7 °C) (Oral)   Resp 20  Ht 69\" (175.3 cm)  Wt 111 lb (50.3 kg)  SpO2 92%  BMI 16.39 kg/m2  Physical Exam   Constitutional: She is oriented to person, place, and time. No distress.   Thin, frail, mild tachypnea when talks more than a few sentences.   HENT:   Head: Normocephalic and atraumatic.   Right " Ear: No swelling or tenderness. Tympanic membrane is not erythematous and not bulging.   Left Ear: No swelling or tenderness. Tympanic membrane is not erythematous and not bulging.   Mouth/Throat: Oropharynx is clear and moist. No oropharyngeal exudate.   Moderate cerumen bilaterally.   Eyes: Conjunctivae and EOM are normal. Pupils are equal, round, and reactive to light. Right eye exhibits no discharge. Left eye exhibits no discharge. No scleral icterus.   Neck: No JVD present. No tracheal deviation present.   Cardiovascular: Normal rate, regular rhythm and intact distal pulses.    Murmur heard.  Pulses:       Radial pulses are 2+ on the right side, and 2+ on the left side.        Dorsalis pedis pulses are 2+ on the right side, and 2+ on the left side.   No edema.   Pulmonary/Chest: Effort normal and breath sounds normal. She exhibits tenderness.   Very diminished lung sounds all lobes.   Abdominal: Soft. Bowel sounds are normal. She exhibits no distension. There is no tenderness. There is no guarding.   Genitourinary:   Genitourinary Comments: Bladder non-distended, non-tender.   Musculoskeletal: She exhibits no edema or deformity.   Neurological: She is alert and oriented to person, place, and time. No cranial nerve deficit. Coordination normal.   Skin: Skin is warm and dry. No rash noted. She is not diaphoretic. No erythema. No pallor.   Psychiatric: She has a normal mood and affect. Her behavior is normal. Judgment and thought content normal.   Calm, relaxed, cooperative, engages, pleasant.       Results Reviewed:    Lab Results (last 24 hours)     Procedure Component Value Units Date/Time    Comprehensive Metabolic Panel [[id number]]  (Abnormal) Collected:  09/05/17 1816    Specimen:  Blood Updated:  09/05/17 1849     Glucose 100 mg/dL      BUN 40 (H) mg/dL      Creatinine 1.90 (H) mg/dL      Sodium 134 mmol/L      Potassium 4.2 mmol/L      Chloride 101 mmol/L      CO2 28.0 mmol/L      Calcium 9.9 mg/dL       Total Protein 8.3 (H) g/dL      Albumin 4.60 g/dL      ALT (SGPT) 15 U/L      AST (SGOT) 31 U/L      Alkaline Phosphatase 198 (H) U/L      Total Bilirubin 0.3 mg/dL      eGFR Non African Amer 27 (L) mL/min/1.73      Globulin 3.7 gm/dL      A/G Ratio 1.2 (L) g/dL      BUN/Creatinine Ratio 21.1     Anion Gap 5.0 mmol/L     Narrative:       National Kidney Foundation Guidelines    Stage     Description        GFR  1         Normal or High     90+  2         Mild decrease      60-89  3         Moderate decrease  30-59  4         Severe decrease    15-29  5         Kidney failure     <15    Protime-INR [[id number]]  (Abnormal) Collected:  09/05/17 1816    Specimen:  Blood Updated:  09/05/17 2011     Protime 59.6 (C) Seconds      INR 5.20    Narrative:       Therapeutic Ranges for INR: 2.0-3.0 (PT 20-30)                              2.5-3.5 (PT 25-34)    Magnesium [[id number]] Collected:  09/05/17 1816    Specimen:  Blood Updated:  09/05/17 2350    CBC & Differential [[id number]] Collected:  09/05/17 1817    Specimen:  Blood Updated:  09/05/17 2140    Narrative:       The following orders were created for panel order CBC & Differential.  Procedure                               Abnormality         Status                     ---------                               -----------         ------                     CBC Auto Differential[[id number]]        Abnormal            Final result                 Please view results for these tests on the individual orders.    BNP [[id number]]  (Abnormal) Collected:  09/05/17 1817    Specimen:  Blood Updated:  09/05/17 1856     BNP 1514.0 (H) pg/mL     CBC Auto Differential [[id number]]  (Abnormal) Collected:  09/05/17 1817    Specimen:  Blood Updated:  09/05/17 2140     WBC 6.19 10*3/mm3      RBC 3.56 (L) 10*6/mm3      Hemoglobin 11.7 g/dL      Hematocrit 34.7 %      MCV 97.5 fL      MCH 32.9 (H) pg      MCHC 33.7 g/dL      RDW 12.9 %      RDW-SD 46.5 fl      MPV 11.4 fL      Platelets 130 (L)  10*3/mm3      Neutrophil % 80.1 (H) %      Lymphocyte % 15.5 (L) %      Monocyte % 3.7 %      Eosinophil % 0.5 %      Basophil % 0.0 %      Immature Grans % 0.2 %      Neutrophils, Absolute 4.96 10*3/mm3      Lymphocytes, Absolute 0.96 10*3/mm3      Monocytes, Absolute 0.23 10*3/mm3      Eosinophils, Absolute 0.03 10*3/mm3      Basophils, Absolute 0.00 10*3/mm3      Immature Grans, Absolute 0.01 10*3/mm3     Lactic Acid, Plasma [[id number]]  (Normal) Collected:  09/05/17 1817    Specimen:  Blood Updated:  09/05/17 1842     Lactate 0.8 mmol/L       Falsely depressed results may occur on samples drawn from patients receiving N-Acetylcysteine (NAC) or Metamizole.       POC Troponin, Rapid [[id number]]  (Normal) Collected:  09/05/17 1817    Specimen:  Blood Updated:  09/05/17 1835     Troponin I 0.04 ng/mL       Serial Number: [id number]  [id number]       Blood Culture [[id number]] Collected:  09/05/17 2129    Specimen:  Blood from Hand, Right Updated:  09/05/17 2141    Blood Culture [[id number]] Collected:  09/05/17 2129    Specimen:  Blood from Arm, Right Updated:  09/05/17 2141    Urinalysis With / Culture If Indicated [[id number]]  (Abnormal) Collected:  09/05/17 2248    Specimen:  Urine from Urine, Clean Catch Updated:  09/05/17 2328     Color, UA Yellow     Appearance, UA Turbid (A)     pH, UA 5.5     Specific Gravity, UA 1.012     Glucose, UA Negative     Ketones, UA Negative     Bilirubin, UA Negative     Blood, UA Trace (A)     Protein,  mg/dL (2+) (A)     Leuk Esterase, UA Large (3+) (A)     Nitrite, UA Negative     Urobilinogen, UA 0.2 E.U./dL    Urine Drug Screen [[id number]]  (Normal) Collected:  09/05/17 2248    Specimen:  Urine from Urine, Clean Catch Updated:  09/05/17 2335     THC, Screen, Urine Negative     Phencyclidine (PCP), Urine Negative     Cocaine Screen, Urine Negative     Methamphetamine, Urine Negative     Opiate Screen Negative     Amphetamine Screen, Urine Negative     Benzodiazepine  Screen, Urine Negative     Tricyclic Antidepressants Screen Negative     Methadone Screen, Urine Negative     Barbiturates Screen, Urine Negative     Oxycodone Screen, Urine Negative     Propoxyphene Screen Negative     Buprenorphine, Screen, Urine Negative    Narrative:       Cutoff For Drugs Screened:    Amphetamines               500 ng/ml  Barbiturates               200 ng/ml  Benzodiazepines            150 ng/ml  Cocaine                    150 ng/ml  Methadone                  200 ng/ml  Opiates                    100 ng/ml  Phencyclidine               25 ng/ml  THC                            50 ng/ml  Methamphetamine            500 ng/ml  Tricyclic Antidepressants  300 ng/ml  Oxycodone                  100 ng/ml  Propoxyphene               300 ng/ml  Buprenorphine               10 ng/ml    The normal value for all drugs tested is negative. This report includes unconfirmed screening results, with the cutoff values listed, to be used for medical treatment purposes only.  Unconfirmed results must not be used for non-medical purposes such as employment or legal testing.  Clinical consideration should be applied to any drug of abuse test, particularly when unconfirmed results are used.      Urinalysis, Microscopic Only [[id number]]  (Abnormal) Collected:  09/05/17 2248    Specimen:  Urine from Urine, Clean Catch Updated:  09/05/17 2328     RBC, UA 3-6 (A) /HPF      WBC, UA Too Numerous to Count (A) /HPF      Bacteria, UA 3+ (A) /HPF      Squamous Epithelial Cells, UA 0-2 /HPF      Hyaline Casts, UA 0-6 /LPF      Methodology Manual Light Microscopy    Urine Culture [[id number]] Collected:  09/05/17 2248    Specimen:  Urine from Urine, Clean Catch Updated:  09/05/17 2303        ECG  Vent. rate 85 BPM  TN interval 136 ms  QRS duration 98 ms  QT/QTc 390/464 ms  P-R-T axes 25 -25 153  Normal sinus rhythm  ST & T wave abnormality, consider lateral ischemia  Prolonged QT  Abnormal ECG  When compared with ECG of 03-MAR-2017  05:49,  T wave inversion more evident in Lateral leads     XR Chest, 2 Views     CLINICAL HISTORY:    58 years old, female; Pain and signs and symptoms; Shortness of breath; Chest   pain; Left-sided chest pain; Additional info: SOA. Pt has HX of left sided rib   FX, pt attributes left sided chest pain to old rib FX     TECHNIQUE:    Frontal and lateral views of the chest.     COMPARISON:    CR - XR CHEST 1 VW 3/3/2017 11:01:41 AM     FINDINGS:       Lungs:  Unremarkable.  No consolidation.       Pleural space:  Unremarkable.  No pneumothorax.       Heart:  Unremarkable.  No cardiomegaly.       Mediastinum:  Unremarkable.       Bones/joints:  Status post sternotomy. Cardiac valve replacement.       Other findings:  Left-sided pacing device in place.     IMPRESSION:       No acute findings.    I have personally reviewed and interpreted available lab data, radiology studies and ECG obtained at time of admission.     Assessment / Plan     Problem List:   Hospital Problem List     * (Principal)Acute on chronic respiratory failure with hypoxia    Essential hypertension (Chronic)    Overview Signed 10/19/2015  8:56 AM by Digna Santana     benign essential         Ischemic cardiomyopathy (Chronic)    Overview Signed 2/6/2017  8:44 AM by MAN Quesada. History of MI December 2009: s/p stent to RCA and LAD, EF 40%  B. CABGx2 March, 2010: SVG to OMB-1, SVG to PDA  C. EF 30% post-operatively, s/p [employer] ICD placement, 2011  D. Echo July 2014: Severe global hypokinesis with EF 12%, moderate AI, mechanical mitral valve, moderate to severe TR, RVSP 43mmHg   E. Echo 2/4/17: EF 18%, mild to mod AI, mod TR, grossly normal prosthetic mitral valve         PVD (peripheral vascular disease) (Chronic)    Lupus (systemic lupus erythematosus) (Chronic)    Chronic coronary artery disease (Chronic)    COPD exacerbation    Tobacco abuse (Chronic)    Acute systolic heart failure and valvular disease    Overview Addendum 2/6/2017  " 8:44 AM by MAN Quesada             AICD (automatic cardioverter/defibrillator) present (Chronic)    Supratherapeutic INR    CKD (chronic kidney disease), stage IV (Chronic)    UTI (urinary tract infection)          Assessment / Plan:    1. Acute on chronic respiratory failure with hypoxia:  - R/t CHF and COPD exac / AECB, renal disease at baseline.  - Deferred ABG r/t supratherapeutic INR. Last ABG on file x1 8/2015 without hypercapnia then and no known CO2 retention. NPPV PRN if so, and probable GAB component, and diuresis effect.  - \"Borderline\" GAB per pt.  - Diuretics and abx.  - Will check d dimer - pt should be covered as supratherapeutic on coumadin and reports compliance. If elevated, consider VQ scan if needed (avoid contract r/t renal) - lower suspicion as symptoms correlate to allergies/URI and fluid excess.  - Last hospitalization 3/2017 for heart failure.  - Good work cutting back on cigarettes for multiple comorbidities - down to 1/2 PPD or less daily consistently per report. Nicotine patch PRN.    2. Acute on chronic systolic heart failure and valvular heart disease / ischemic cardiomyopathy / coronary artery disease / hypertension:  - Continue BB.  - Defer ACEI/ARB and suspect r/t renal.  - Continue statin.  - Hydralazine as well for HTN.  - Status AICD - otherwise DNR status reported so may need additional teaching/clarifications.  - Chronic coumadin for mechanical mitral valve - goal 2.5-3.5, supratherapeutic today, pharmacy to dose.  - PVD as well.    3. Exacerbation of chronic obstructive pulmonary disease/bronchitis / tobacco abuse:  - Solumedrol, abx r/t immunocompromised (continue rocephin, change azithromycin to doxy r/t prolonged QTc). Continue home regiment but increase nebs component. Add guaifenesin.  - Chronic high dose klonopin; one small norco in April as only narcotic. PAPI reviewed.  - Pleurisy / rib pain, CXR negative for fractures (has hx of them a year ago). " Dextromethorphan PRN for cough suppression first, then phenergan-codeine q6h PRN - if upsets stomach can change to low dose norco PRN. Bowel program.  - Nicotine patch.  - Needs outpatient pulmonologist.    4. Urinary tract infection / diarrhea:  - Present on admission.  - Rocephin/doxy for respiratory will cover and renal friendly.  - Await C&S.  - Note pt c/o 2 days of diarrhea - check c diff as has hx of it, no recent abx.    4. Chronic kidney disease IIIb-IV:  - At baseline, Cr 1.7-1.9, hx AKIs.  - SLE.  - Needs outpatient nephrologist.    5. Systemic lupus erythematous:  - Continue plaquenil.  - Needs new specialists since moved here from [place] last year.      DVT prophylaxis: amos Rodas, already anticoagulated on coumadin with supra therapeutic INR and pharmacy to dose.    Code Status: DNR, full support, prefers no intubation for distress. Otherwise no restrictions. Son Floyd Roldan as healthcare surrogate. Consider [facility] consult if needed for GOC or symptom management r/t multiple advanced co morbidities.    Admission Status: Patient will be admitted to INPATIENT status due to the need for care which can only be reasonably provided in an hospital setting such as aggressive/expedited ancillary services and/or consultation services, the necessity for IV medications, close physician monitoring and/or the possible need for procedures.  In such, I feel patient’s risk for adverse outcomes and need for care warrant INPATIENT evaluation and predict the patient’s care encounter to likely last beyond 2 midnights.     IAIN Quintanilla 09/05/17 11:53 PM      Brief Attending Admission Note     I have seen and examined the patient, performing an independent face-to-face diagnostic evaluation with plan of care reviewed and developed with the advanced practice clinician IAIN Cortez.      Brief Summary Statement/HPI:   Ms. Roldan is a very pleasant 58 year old  woman with known COPD and  severe systolic heart failure (EF 18% in Feb 2017) and valvular heart disease s/p MMV on coumadin who presents to [facility] ED today with worsening dyspnea for the last 4 days.  She states that her symptoms started with sore throat, rhinorrhea, sinus congestion and dry cough followed by worsening dyspnea and a productive yellowish cough.  She has been sitting in a tripod position at home to breathe.  She chronically sleeps in a recliner. She also has PMH significant for CKD III-IV, AICD, HTN, ischemic CM, SLE, PVD and ongoing tobacco abuse.  She received solumedrol 125 mg, duonebs, lasix 80 mg, azithromycin 500 mg and rocephin 1gm in the ED.       Attending Physical Exam:  Constitutional: Mild dyspnea, sitting up, awake, alert, thin and chronically ill appearing  Eyes: PERRLA, sclerae anicteric, no conjunctival injection  HENT: NCAT, mucous membranes moist  Neck: supple, no thyromegaly, no lymphadenopathy, trachea midline  Respiratory: Wheezing and tight throughout  Cardiovascular: RRR, no murmurs, rubs, or gallops, palpable pedal pulses bilaterally  Gastrointestinal: Positive bowel sounds, soft, nontender, nondistended  Musculoskeletal: No bilateral ankle edema, no clubbing or cyanosis to bilateral lower extremities  Psychiatric: oriented x 3, appropriate affect, cooperative  Neurologic: Strength symmetric in all extremities, Cranial Nerves grossly intact to confrontation, speech clear  Derm: no rashes    Brief Assessment/Plan :  Acute on chronic hypoxic respiratory failure secondary to AECOPD:  --Rocephin and doxy for bronchitis presentation (changed from azith due to history of prolonged QT)  --Duonebs and steroids  --Feels better but still dyspneic  --Nicotine replacement and cessation counseling  --No investigation of possible PE due to supratherapeutic INR    UTI:  --Rocephin for now, await culture    Systolic heart failure, EF 18%  --Despite elevated BNP appears well compensated  --Hold further IV lasix  --Daily  weights  --I/O    Supratherapeutic INR:  --Hold coumadin for now,  --Pharmacy to dose    See above for further detailed assessment and plan developed with APC which I have reviewed and/or edited.      I believe this patient meets .LEXINPT: Patient with acute on chronic respiratory failure secondary to AECOPD as well as UTI requiring antibiotics, steroids, nebs and aggressive supportive care.  Also with severe systolic HF at baseline - multiple comorbidities complicate care.      Juliane Shabazz MD  09/06/17  2:04 AM

## 2021-01-25 NOTE — ED STATDOCS - PATIENT PORTAL LINK FT
You can access the FollowMyHealth Patient Portal offered by [facility] by registering at the following website: [email or web address]. By joining [employer]’s FollowMyHealth portal, you will also be able to view your health information using other applications (apps) compatible with our system.

## 2021-01-25 NOTE — ED STATDOCS - NS ED ROS FT
General: Denies fever, chills  HEENT: Denies sensory changes, sore throat  Neck: Denies neck pain, neck stiffness  Resp: Denies coughing, SOB  Cardiovascular: Denies CP, palpitations, LE edema  GI: Denies abdominal pain, nausea, vomiting, diarrhea  : Denies dysuria, hematuria, incontinence  MSK: Denies back pain  Neuro: Denies HA, dizziness, numbness, weakness  Skin: Denies rashes

## 2021-01-25 NOTE — ED STATDOCS - ATTENDING CONTRIBUTION TO CARE
HPI: 24yo male with no PMH presenting with R nare pain x 2 days. Patient states that he has had infection to skin on nose twice over last month, was prescribed Augmentin which he stopped taking after a few days because he was feeling better. No fevers/chills/discharge. No headache. Pt states he keeps picking at nose.     PE:  Gen: NAD  Head: NCAT  HEENT: R nare slight erythema, no discharge/purulent drainage/fluctuance, no TTP over maxillary or frontal sinuses, EOMI, R nare patent  CV: normal perfusion  Resp: no respiratory distress  GI: Abd Soft NTND  Neuro: No focal neuro deficits  MSK: FROM all 4 extremities, no deformity  Skin: No rash, no bruising  Psych: Normal affect    MDM: Pt with R nare cellulitis- tx with augmentin, topical bacitracin, pee Rain DO     I performed a history and physical exam of the patient and discussed their management with the advanced care provider. I reviewed the advanced care provider's note and agree with the documented findings and plan of care. My medical decision making and objective findings are found above. HPI: 24yo male with no PMH presenting with R nare pain x 2 days. Patient states that he has had infection to skin on nose twice over last month, was prescribed Augmentin which he stopped taking after a few days because he was feeling better. No fevers/chills/discharge. No headache. Pt states he keeps picking at nose.     PE:  Gen: NAD  Head: NCAT  HEENT: R nare slight erythema, no discharge/purulent drainage/fluctuance, no TTP over maxillary or frontal sinuses, EOMI, R nare patent  CV: normal perfusion  Resp: no respiratory distress  GI: Abd Soft NTND  Neuro: No focal neuro deficits  MSK: FROM all 4 extremities, no deformity  Skin: No rash, no bruising  Psych: Normal affect    MDM: Pt with R nare cellulitis, mild, - tx with augmentin, topical bacitracin, pee Rain DO     I performed a history and physical exam of the patient and discussed their management with the resident. I reviewed the resident's note and agree with the documented findings and plan of care. My medical decision making and objective findings are found above. HPI: 26yo male with no PMH presenting with R nare pain x 2 days. Patient states that he has had infection to skin on nose twice over last month, was prescribed Augmentin which he stopped taking after a few days because he was feeling better. No fevers/chills/discharge. No headache. Pt states he keeps picking at nose.     PE:  Gen: NAD  Head: NCAT  HEENT: R nare slight erythema, no discharge/purulent drainage/fluctuance, no TTP over maxillary or frontal sinuses, EOMI, R nare patent, no cervical lymphadenopathy  CV: normal perfusion  Resp: no respiratory distress  GI: Abd Soft NTND  Neuro: No focal neuro deficits  MSK: FROM all 4 extremities, no deformity  Skin: No rash, no bruising  Psych: Normal affect    MDM: Pt with R nare cellulitis, mild, - tx with augmentin, topical bacitracin, pee Rain DO     I performed a history and physical exam of the patient and discussed their management with the resident. I reviewed the resident's note and agree with the documented findings and plan of care. My medical decision making and objective findings are found above.

## 2021-01-25 NOTE — ED STATDOCS - PHYSICAL EXAMINATION
General: Well appearing male in no acute distress  HEENT: Nares mildly erythematous with dried mucosa in R nare, signs of digital trauma. Moist mucous membranes. Oropharynx clear. No tender cervical, facial, mandibular lymphadenopathy. No maxillary or frontal sinus tenderness. No nasal discharge.   Eyes: No scleral icterus. EOMI. SULEMAN.  Neck: Soft and supple. Full ROM without pain. No midline tenderness  Cardiac: Regular rate and regular rhythm. No murmurs. No LE edema.  Resp: Lungs CTAB. No wheezes, rales or rhonchi.  Abd: Soft, non-tender, non-distended. No guarding or rebound. No scars, masses, or lesions.  Back: Spine midline and non-tender. No CVA tenderness.    Skin: No rashes, abrasions, or lacerations.  Neuro: AO x 3. Moves all extremities symmetrically. Motor strength and sensation grossly intact.

## 2021-06-06 ENCOUNTER — EMERGENCY (EMERGENCY)
Facility: HOSPITAL | Age: 26
LOS: 1 days | Discharge: HOME | End: 2021-06-06
Attending: EMERGENCY MEDICINE
Payer: MEDICAID

## 2021-06-06 VITALS
OXYGEN SATURATION: 96 % | HEART RATE: 82 BPM | TEMPERATURE: 99 F | HEIGHT: 67 IN | WEIGHT: 199.96 LBS | RESPIRATION RATE: 18 BRPM | SYSTOLIC BLOOD PRESSURE: 135 MMHG | DIASTOLIC BLOOD PRESSURE: 87 MMHG

## 2021-06-06 PROCEDURE — 99283 EMERGENCY DEPT VISIT LOW MDM: CPT

## 2021-06-06 RX ORDER — NYSTATIN/TRIAMCINOLONE ACET
1 OINTMENT (GRAM) TOPICAL
Qty: 1 | Refills: 0
Start: 2021-06-06 | End: 2021-06-12

## 2021-06-06 RX ORDER — MICONAZOLE NITRATE 2 %
1 CREAM (GRAM) TOPICAL ONCE
Refills: 0 | Status: COMPLETED | OUTPATIENT
Start: 2021-06-06 | End: 2021-06-06

## 2021-06-06 RX ADMIN — Medication 1 APPLICATION(S): at 03:24

## 2021-06-06 NOTE — ED PROVIDER NOTE - CARE PROVIDER_API CALL
Kenia Quiñones)  Dermatology  [address], [address]  [place] [address]  Phone: [phone]  Fax: [phone]  Follow Up Time:

## 2021-06-06 NOTE — ED PROVIDER NOTE - GENITOURINARY, MLM
Performed with chaperone CHANG Melendez, erythema to 12 oclock portion of the glans penis, no dc from urethral meatus, no testicular tenderness to palpation, no vesicles

## 2021-06-06 NOTE — ED PROVIDER NOTE - ATTENDING CONTRIBUTION TO CARE
24 yo M c/o intermittent rash on the head of his penis x last 6 months. Pt sexually active with one partner  and denies any penile d/c.  No dysuria or hematuria.  On exam pt awake and alert in NAD, Abd soft, NT,  + uncirc male, ? irritation to glans.  Rx topical steroid/antifungal with f/u as outpt 26 yo M c/o intermittent rash on the head of his penis x last 6 months. Pt sexually active with one partner  and denies any penile d/c.  No dysuria or hematuria.  On exam pt awake and alert in NAD, Abd soft, NT,  + uncirc male, ? irritation to glans.  Rx topical antifungal with f/u as outpt

## 2021-06-06 NOTE — ED PROVIDER NOTE - CPE EDP EYES NORM
FANNY Allen  You 27 minutes ago (3:13 PM)     Ra Zhao,     Yes, it is okay to switch order to CT Chest W Contrast. Thank you for letting us know.         normal...

## 2021-06-06 NOTE — ED PROVIDER NOTE - PROGRESS NOTE DETAILS
Pt left without discharge paperwork, unable to confirm pharmacy, sent rx to [facility] previously in computer.

## 2021-06-06 NOTE — ED ADULT TRIAGE NOTE - CHIEF COMPLAINT QUOTE
patient states that he has an infection/ pain  to tip of penis, states has been treated for yeast infection, red spots noted, it keeps coming back since January

## 2021-06-06 NOTE — ED PROVIDER NOTE - OBJECTIVE STATEMENT
25 year old male with no PMHx presents to the ED for rash to the penis which has been intermittently occurring since January. This episode of rash started 1 week ago. Pt report no new sexual partners, currently only sexually active with wife. Denies fevers, chills, abd pain, dysuria, hematuria, itching, blisters.

## 2021-06-06 NOTE — ED PROVIDER NOTE - NSFOLLOWUPINSTRUCTIONS_ED_ALL_ED_FT
Take medication as prescribed   Follow up with Dermatologist within 1 week   Follow up with primary medical doctor in 2-3 days       Balanitis    AMBULATORY CARE:    Balanitis is inflammation and possible infection of the glans (head) of the penis. It may be caused by fungus, bacteria, or an STD. It may also be caused by an allergic reaction to latex, spermicides, medicines such as antibiotics, or soaps. Balanitis usually happens due to poor hygiene practices.    Symptoms of balanitis include:   •Redness, swelling, and pain    •Sores or red patches on the head of the penis    •Bleeding from the penis    •Foul smelling discharge     •Thick, leathery skin    Seek help immediately if:   •You have trouble urinating.    Call your doctor if:   •Your symptoms get worse.     •Your symptoms return after treatment is complete.    •You have questions or concerns about your condition or care.    Sit in a sitz bath 2 to 3 times a day to reduce swelling:   •Fill the bathtub 4 to 6 inches (10 to 15 cm) with clean warm water.    •Sit in the water for about 20 minutes each time.    Clean the area every day:   •Push back the foreskin before cleaning.    •Use a cotton swab to clean between the foreskin and the glans.    •Clean with water only. Do not use soap.    •Dry the area well.    •Pull the foreskin back into place.    Control your blood sugar levels if you have diabetes:  Follow your recommended diabetes management plan.    Follow up with your doctor as directed: Write down your questions so you remember to ask them during your visits

## 2021-07-25 ENCOUNTER — EMERGENCY (EMERGENCY)
Facility: HOSPITAL | Age: 26
LOS: 1 days | Discharge: HOME | End: 2021-07-25
Attending: STUDENT IN AN ORGANIZED HEALTH CARE EDUCATION/TRAINING PROGRAM
Payer: MEDICAID

## 2021-07-25 VITALS
OXYGEN SATURATION: 99 % | HEIGHT: 67 IN | SYSTOLIC BLOOD PRESSURE: 136 MMHG | RESPIRATION RATE: 16 BRPM | WEIGHT: 202.38 LBS | HEART RATE: 85 BPM | DIASTOLIC BLOOD PRESSURE: 86 MMHG | TEMPERATURE: 99 F

## 2021-07-25 LAB
ALBUMIN SERPL ELPH-MCNC: 4.2 G/DL — SIGNIFICANT CHANGE UP (ref 3.3–5.2)
ALP SERPL-CCNC: 104 U/L — SIGNIFICANT CHANGE UP (ref 40–120)
ALT FLD-CCNC: 21 U/L — SIGNIFICANT CHANGE UP
ANION GAP SERPL CALC-SCNC: 11 MMOL/L — SIGNIFICANT CHANGE UP (ref 5–17)
AST SERPL-CCNC: 27 U/L — SIGNIFICANT CHANGE UP
BASOPHILS # BLD AUTO: 0.06 K/UL — SIGNIFICANT CHANGE UP (ref 0–0.2)
BASOPHILS NFR BLD AUTO: 0.5 % — SIGNIFICANT CHANGE UP (ref 0–2)
BILIRUB SERPL-MCNC: 0.3 MG/DL — LOW (ref 0.4–2)
BUN SERPL-MCNC: 20.1 MG/DL — HIGH (ref 8–20)
CALCIUM SERPL-MCNC: 9.3 MG/DL — SIGNIFICANT CHANGE UP (ref 8.6–10.2)
CHLORIDE SERPL-SCNC: 100 MMOL/L — SIGNIFICANT CHANGE UP (ref 98–107)
CO2 SERPL-SCNC: 23 MMOL/L — SIGNIFICANT CHANGE UP (ref 22–29)
CREAT SERPL-MCNC: 0.57 MG/DL — SIGNIFICANT CHANGE UP (ref 0.5–1.3)
EOSINOPHIL # BLD AUTO: 0.23 K/UL — SIGNIFICANT CHANGE UP (ref 0–0.5)
EOSINOPHIL NFR BLD AUTO: 2 % — SIGNIFICANT CHANGE UP (ref 0–6)
GLUCOSE SERPL-MCNC: 112 MG/DL — HIGH (ref 70–99)
HCT VFR BLD CALC: 43.6 % — SIGNIFICANT CHANGE UP (ref 39–50)
HGB BLD-MCNC: 14 G/DL — SIGNIFICANT CHANGE UP (ref 13–17)
IMM GRANULOCYTES NFR BLD AUTO: 0.5 % — SIGNIFICANT CHANGE UP (ref 0–1.5)
LYMPHOCYTES # BLD AUTO: 2.64 K/UL — SIGNIFICANT CHANGE UP (ref 1–3.3)
LYMPHOCYTES # BLD AUTO: 22.8 % — SIGNIFICANT CHANGE UP (ref 13–44)
MCHC RBC-ENTMCNC: 27.1 PG — SIGNIFICANT CHANGE UP (ref 27–34)
MCHC RBC-ENTMCNC: 32.1 GM/DL — SIGNIFICANT CHANGE UP (ref 32–36)
MCV RBC AUTO: 84.5 FL — SIGNIFICANT CHANGE UP (ref 80–100)
MONOCYTES # BLD AUTO: 0.69 K/UL — SIGNIFICANT CHANGE UP (ref 0–0.9)
MONOCYTES NFR BLD AUTO: 6 % — SIGNIFICANT CHANGE UP (ref 2–14)
NEUTROPHILS # BLD AUTO: 7.9 K/UL — HIGH (ref 1.8–7.4)
NEUTROPHILS NFR BLD AUTO: 68.2 % — SIGNIFICANT CHANGE UP (ref 43–77)
PLATELET # BLD AUTO: 405 K/UL — HIGH (ref 150–400)
POTASSIUM SERPL-MCNC: 3.9 MMOL/L — SIGNIFICANT CHANGE UP (ref 3.5–5.3)
POTASSIUM SERPL-SCNC: 3.9 MMOL/L — SIGNIFICANT CHANGE UP (ref 3.5–5.3)
PROT SERPL-MCNC: 7.9 G/DL — SIGNIFICANT CHANGE UP (ref 6.6–8.7)
RBC # BLD: 5.16 M/UL — SIGNIFICANT CHANGE UP (ref 4.2–5.8)
RBC # FLD: 12.8 % — SIGNIFICANT CHANGE UP (ref 10.3–14.5)
SODIUM SERPL-SCNC: 134 MMOL/L — LOW (ref 135–145)
T4 AB SER-ACNC: 6.5 UG/DL — SIGNIFICANT CHANGE UP (ref 4.5–12)
TSH SERPL-MCNC: 2.36 UIU/ML — SIGNIFICANT CHANGE UP (ref 0.27–4.2)
WBC # BLD: 11.58 K/UL — HIGH (ref 3.8–10.5)
WBC # FLD AUTO: 11.58 K/UL — HIGH (ref 3.8–10.5)

## 2021-07-25 PROCEDURE — 80053 COMPREHEN METABOLIC PANEL: CPT

## 2021-07-25 PROCEDURE — 96374 THER/PROPH/DIAG INJ IV PUSH: CPT

## 2021-07-25 PROCEDURE — 85025 COMPLETE CBC W/AUTO DIFF WBC: CPT

## 2021-07-25 PROCEDURE — 36415 COLL VENOUS BLD VENIPUNCTURE: CPT

## 2021-07-25 PROCEDURE — 84443 ASSAY THYROID STIM HORMONE: CPT

## 2021-07-25 PROCEDURE — 99284 EMERGENCY DEPT VISIT MOD MDM: CPT | Mod: 25

## 2021-07-25 PROCEDURE — 99284 EMERGENCY DEPT VISIT MOD MDM: CPT

## 2021-07-25 PROCEDURE — 93005 ELECTROCARDIOGRAM TRACING: CPT

## 2021-07-25 PROCEDURE — 93010 ELECTROCARDIOGRAM REPORT: CPT

## 2021-07-25 PROCEDURE — 84436 ASSAY OF TOTAL THYROXINE: CPT

## 2021-07-25 RX ORDER — SODIUM CHLORIDE 9 MG/ML
1000 INJECTION INTRAMUSCULAR; INTRAVENOUS; SUBCUTANEOUS ONCE
Refills: 0 | Status: COMPLETED | OUTPATIENT
Start: 2021-07-25 | End: 2021-07-25

## 2021-07-25 RX ORDER — ONDANSETRON 8 MG/1
4 TABLET, FILM COATED ORAL ONCE
Refills: 0 | Status: COMPLETED | OUTPATIENT
Start: 2021-07-25 | End: 2021-07-25

## 2021-07-25 RX ORDER — HYDROXYZINE HCL 10 MG
25 TABLET ORAL ONCE
Refills: 0 | Status: COMPLETED | OUTPATIENT
Start: 2021-07-25 | End: 2021-07-25

## 2021-07-25 RX ADMIN — Medication 25 MILLIGRAM(S): at 19:07

## 2021-07-25 RX ADMIN — SODIUM CHLORIDE 1000 MILLILITER(S): 9 INJECTION INTRAMUSCULAR; INTRAVENOUS; SUBCUTANEOUS at 19:07

## 2021-07-25 RX ADMIN — ONDANSETRON 4 MILLIGRAM(S): 8 TABLET, FILM COATED ORAL at 19:07

## 2021-07-25 NOTE — ED PROVIDER NOTE - NS ED ROS FT
No fever/chills, No photophobia/eye pain/changes in vision, No ear pain/sore throat/dysphagia, No chest pain/palpitations, no SOB/cough/wheeze/stridor, No abdominal pain, +nausea. no vomiting/diarrhea, no dysuria/frequency/discharge, No neck/back pain, no rash, no changes in neurological status/function.

## 2021-07-25 NOTE — ED PROVIDER NOTE - PROGRESS NOTE DETAILS
PA NOTE: No emergent findings on lab work / EKG. Pt reports improvement in symptoms at this time. Pt offered PO Hydroxyzine in attempt to alleviate symptoms but refused. Pt advised to follow up with primary cardiologist and return to ED for any new or worsening symptoms.

## 2021-07-25 NOTE — ED PROVIDER NOTE - PATIENT PORTAL LINK FT
You can access the FollowMyHealth Patient Portal offered by [facility] by registering at the following website: [email or web address]. By joining [employer]’s FollowMyHealth portal, you will also be able to view your health information using other applications (apps) compatible with our system.

## 2021-07-25 NOTE — ED PROVIDER NOTE - OBJECTIVE STATEMENT
Pt is a 24 yo M co palpitations.  Pt states that he was at home today when he suddenly felt weak and felt like he was going to pass out. Pt states that he ate some food and felt a little better but when he stands up he feels dizzy and weak with nausea. Pt also states that he has had palpitations with this. no fever/chills. no cp. no sob. no other complaints.

## 2021-07-25 NOTE — ED PROVIDER NOTE - CLINICAL SUMMARY MEDICAL DECISION MAKING FREE TEXT BOX
Pt with near syncope/lightheadedness. Pt also very anxious. will give ivf, zofran, hydroxyzine, check labs and reassess.

## 2021-07-25 NOTE — ED PROVIDER NOTE - ATTENDING CONTRIBUTION TO CARE
I performed a face to face history and physical exam of the patient and discussed their management with the student/resident/ACP. I reviewed the student/resident/ACP's note and agree with the documented findings and plan of care.    labs and ekg reviewed. symptoms likely related to anxiety. Pt reassured and instructed to f/up with pcp in 1-2 days. instructed to return for any new/concerning symptoms.

## 2022-04-21 NOTE — ED PROVIDER NOTE - PRINCIPAL DIAGNOSIS
Regional Block  Performed by: Laura Dudley MD  Authorized by: Laura Dudley MD       General Information and Staff    Start Time:  4/21/2022 12:04 PM  End Time:  4/21/2022 12:08 PM  Anesthesiologist:  Laura Dudley MD  Patient Location:  OR    Block Placement: Pre Induction  Site Identification: real time ultrasound guided and image stored and retrievable    Block site/laterality marked before start: site marked  Reason for Block: at surgeon's request and post-op pain management    Preanesthetic Checklist: 2 patient identifers, IV checked, risks and benefits discussed, monitors and equipment checked, pre-op evaluation, timeout performed, anesthesia consent, sterile technique used, no prohibitive neurological deficits and no local skin infection at insertion site      Procedure Details    Patient Position:  Supine  Prep: ChloraPrep    Monitoring:  Cardiac monitor, continuous pulse ox and blood pressure cuff  Block Type: Adductor canal  Laterality:  Left  Injection Technique:  Single-shot    Needle    Needle Type:  Short-bevel and echogenic  Needle Gauge:  21 G  Needle Length:  100 mm  Needle Localization:  Ultrasound guidance  Reason for Ultrasound Use: appropriate spread of the medication was noted in real time and no ultrasound evidence of intravascular and/or intraneural injection            Assessment    Injection Assessment:  Good spread noted, negative resistance, negative aspiration for heme, incremental injection and low pressure  Heart Rate Change: No    - Patient tolerated block procedure well without evidence of immediate block related complications.      Medications      Additional Comments    Medication:  Bupivacaine 0.25% 20mL with 2 mg of PF dexamethasone and 150 mcg of buprenorphine Infection of nose

## 2023-02-27 NOTE — ED ADULT TRIAGE NOTE - PAIN: PRESENCE, MLM
denies pain/discomfort Render In Strict Bullet Format?: No Detail Level: Detailed Continue Regimen: Triamcinolone Cream 0.1% 1-2 times a week as needed

## 2023-09-02 ENCOUNTER — OFFICE (OUTPATIENT)
Dept: URBAN - METROPOLITAN AREA CLINIC 104 | Facility: CLINIC | Age: 28
Setting detail: OPHTHALMOLOGY
End: 2023-09-02
Payer: COMMERCIAL

## 2023-09-02 DIAGNOSIS — H40.013: ICD-10-CM

## 2023-09-02 DIAGNOSIS — H16.223: ICD-10-CM

## 2023-09-02 DIAGNOSIS — H18.622: ICD-10-CM

## 2023-09-02 PROCEDURE — 92025 CPTRIZED CORNEAL TOPOGRAPHY: CPT | Performed by: OPHTHALMOLOGY

## 2023-09-02 PROCEDURE — 92014 COMPRE OPH EXAM EST PT 1/>: CPT | Performed by: OPHTHALMOLOGY

## 2023-09-02 ASSESSMENT — AXIALLENGTH_DERIVED
OS_AL: 23.29
OS_AL: 23.48
OD_AL: 24.66
OD_AL: 24.88

## 2023-09-02 ASSESSMENT — SPHEQUIV_DERIVED
OD_SPHEQUIV: -0.75
OS_SPHEQUIV: -1.5
OD_SPHEQUIV: -1.25
OS_SPHEQUIV: -2

## 2023-09-02 ASSESSMENT — REFRACTION_MANIFEST
OD_VA1: 20/20
OD_CYLINDER: -0.50
OD_SPHERE: -0.50
OS_AXIS: 150
OS_CYLINDER: -2.50
OD_CYLINDER: -0.50
OS_VA1: 20/40+1
OS_SPHERE: -1.00
OD_VA1: 20/20
OS_AXIS: 90
OS_VA1: 20/40+1
OD_AXIS: 090
OS_SPHERE: PLANO
OD_AXIS: 100
OS_CYLINDER: -2.00
OD_SPHERE: PLANO

## 2023-09-02 ASSESSMENT — SUPERFICIAL PUNCTATE KERATITIS (SPK)
OS_SPK: 1+ 2+
OD_SPK: 1+ 2+

## 2023-09-02 ASSESSMENT — KERATOMETRY
OD_AXISANGLE_DEGREES: 098
OD_K1POWER_DIOPTERS: 41.46
OS_K1POWER_DIOPTERS: 43.49
OS_K2POWER_DIOPTERS: 48.35
OS_AXISANGLE_DEGREES: 043
OD_K2POWER_DIOPTERS: 41.56
METHOD_AUTO_MANUAL: AUTO

## 2023-09-02 ASSESSMENT — REFRACTION_AUTOREFRACTION
OD_AXIS: 106
OD_CYLINDER: -0.50
OD_SPHERE: -1.00
OS_CYLINDER: -5.50
OS_AXIS: 113
OS_SPHERE: +1.25

## 2023-09-02 ASSESSMENT — VISUAL ACUITY
OD_BCVA: 20/150-
OS_BCVA: 20/20-2

## 2023-09-02 ASSESSMENT — TONOMETRY
OS_IOP_MMHG: 19
OD_IOP_MMHG: 16

## 2023-09-02 ASSESSMENT — CONFRONTATIONAL VISUAL FIELD TEST (CVF)
OD_FINDINGS: FULL
OS_FINDINGS: FULL

## 2023-11-14 NOTE — ED PROVIDER NOTE - CARDIOVASCULAR NEGATIVE STATEMENT, MLM
Patient contacted and is a new hire with [facility] from [place].  She has a letter with instructions to complete a TB test at our facility. Writer contacted our lab for specific directions.  Patient was instructed to show this letter to any [facility] lab and an order is placed/completed at that time.      Nothing further at this time.     no chest pain and no edema.

## 2024-03-23 ENCOUNTER — OFFICE (OUTPATIENT)
Dept: URBAN - METROPOLITAN AREA CLINIC 70 | Facility: CLINIC | Age: 29
Setting detail: OPHTHALMOLOGY
End: 2024-03-23
Payer: COMMERCIAL

## 2024-03-23 DIAGNOSIS — H18.622: ICD-10-CM

## 2024-03-23 DIAGNOSIS — H16.223: ICD-10-CM

## 2024-03-23 DIAGNOSIS — H40.013: ICD-10-CM

## 2024-03-23 PROCEDURE — 92025 CPTRIZED CORNEAL TOPOGRAPHY: CPT | Performed by: OPHTHALMOLOGY

## 2024-03-23 PROCEDURE — 92012 INTRM OPH EXAM EST PATIENT: CPT | Performed by: OPHTHALMOLOGY

## 2024-03-23 PROCEDURE — 92133 CPTRZD OPH DX IMG PST SGM ON: CPT | Performed by: OPHTHALMOLOGY

## 2024-03-23 ASSESSMENT — SPHEQUIV_DERIVED
OD_SPHEQUIV: -0.75
OS_SPHEQUIV: -2
OS_SPHEQUIV: -3.25
OD_SPHEQUIV: -1.5

## 2024-03-23 ASSESSMENT — REFRACTION_MANIFEST
OS_AXIS: 90
OD_AXIS: 088
OD_AXIS: 100
OS_AXIS: 150
OS_VA1: 20/50-1
OS_VA1: 20/40+1
OD_SPHERE: -0.50
OS_CYLINDER: -2.50
OD_VA1: 20/20
OD_AXIS: 090
OS_SPHERE: PLANO
OD_SPHERE: PLANO
OD_CYLINDER: -0.50
OD_CYLINDER: -0.50
OS_VA1: 20/40+1
OS_AXIS: 139
OS_SPHERE: -1.75
OD_VA1: 20/20
OS_CYLINDER: -3.00
OS_SPHERE: -1.00
OD_SPHERE: -1.25
OD_CYLINDER: -0.50
OD_VA1: 20/20
OS_CYLINDER: -2.00

## 2024-05-04 ENCOUNTER — OFFICE (OUTPATIENT)
Dept: URBAN - METROPOLITAN AREA CLINIC 116 | Facility: CLINIC | Age: 29
Setting detail: OPHTHALMOLOGY
End: 2024-05-04
Payer: COMMERCIAL

## 2024-05-04 DIAGNOSIS — H18.622: ICD-10-CM

## 2024-05-04 DIAGNOSIS — Z01.00: ICD-10-CM

## 2024-05-04 PROCEDURE — 92014 COMPRE OPH EXAM EST PT 1/>: CPT | Performed by: OPTOMETRIST

## 2024-05-04 PROCEDURE — V2513 CONTACT LENS EXTENDED WEAR: HCPCS | Performed by: OPTOMETRIST

## 2024-05-04 PROCEDURE — 92072 FITG C-LENS KERATOCONUS 1ST: CPT | Performed by: OPTOMETRIST

## 2024-05-04 ASSESSMENT — CONFRONTATIONAL VISUAL FIELD TEST (CVF)
OD_FINDINGS: FULL
OS_FINDINGS: FULL

## 2024-05-13 PROBLEM — H18.612 KERATOCONUS, STABLE; LEFT EYE: Status: ACTIVE | Noted: 2024-05-04

## 2024-05-21 NOTE — ED ADULT NURSE NOTE - HIV OFFER
Previously Declined (within the last year) [Follow-Up Visit] : a follow-up visit for [Other:___] : [unfilled]

## 2024-07-27 ENCOUNTER — OFFICE (OUTPATIENT)
Dept: URBAN - METROPOLITAN AREA CLINIC 70 | Facility: CLINIC | Age: 29
Setting detail: OPHTHALMOLOGY
End: 2024-07-27
Payer: COMMERCIAL

## 2024-07-27 DIAGNOSIS — Z01.00: ICD-10-CM

## 2024-07-27 DIAGNOSIS — H18.612: ICD-10-CM

## 2024-07-27 PROCEDURE — 92025 CPTRIZED CORNEAL TOPOGRAPHY: CPT | Performed by: OPHTHALMOLOGY

## 2024-07-27 PROCEDURE — 99213 OFFICE O/P EST LOW 20 MIN: CPT | Performed by: OPHTHALMOLOGY

## 2024-07-27 ASSESSMENT — CONFRONTATIONAL VISUAL FIELD TEST (CVF)
OS_FINDINGS: FULL
OD_FINDINGS: FULL

## 2024-08-28 ASSESSMENT — REFRACTION_MANIFEST
OD_SPHERE: -1.25
OS_CYLINDER: -2.50
OD_CYLINDER: -0.50
OD_AXIS: 090
OD_VA1: 20/20
OS_SPHERE: PLANO
OD_AXIS: 088
OS_VA1: 20/40+1
OS_VA1: 20/80PH20/40
OD_VA1: 20/20
OS_VA1: 20/40+1
OD_CYLINDER: -0.50
OD_CYLINDER: -0.50
OD_VA1: 20/20
OD_CYLINDER: -0.50
OS_SPHERE: -1.00
OD_SPHERE: -0.50
OS_SPHERE: -1.75
OS_AXIS: 140
OS_AXIS: 150
OS_AXIS: 139
OD_SPHERE: -0.50
OS_CYLINDER: -2.00
OD_VA1: 20/20
OS_CYLINDER: -3.00
OD_AXIS: 090
OS_AXIS: 90
OD_SPHERE: PLANO
OS_VA1: 20/50-1
OS_CYLINDER: -2.50
OS_SPHERE: -2.00
OD_AXIS: 100

## 2024-08-28 ASSESSMENT — KERATOMETRY
OS_AXISANGLE_DEGREES: 025
OS_K2POWER_DIOPTERS: 52.50
OD_K2POWER_DIOPTERS: 41.75
OS_K1POWER_DIOPTERS: 48.75
OD_AXISANGLE_DEGREES: 175
METHOD_AUTO_MANUAL: AUTO
OD_K1POWER_DIOPTERS: 41.50

## 2024-09-04 NOTE — ED ADULT NURSE NOTE - CHIEF COMPLAINT QUOTE
----- Message from Armin Graf sent at 9/4/2024  9:07 AM CDT -----  Who is Calling:AJ CHAPPELL [[id number]]        What is the request in detail: Patient is running 15 minutes late.  If patient needs to reschedule, please contact.          Can the clinic reply by [facility]: NO      What Number to Call Back if not in [facility]:   patient states that he has an infection/ pain  to tip of penis, states has been treated for yeast infection, red spots noted, it keeps coming back since January

## 2025-04-04 NOTE — ED PROVIDER NOTE - GASTROINTESTINAL, MLM
[facility]  INTMED  [facility]   Scheduled Appointment: 04/22/2025    Patricia Monae  [facility]  PEDALLERGY [address]  Scheduled Appointment: 04/29/2025    Margo Ash  [facility]  INFDISEASE [address]  Scheduled Appointment: 04/30/2025    [facility]  PULMMED [address] [facility] [address]  Scheduled Appointment: 05/01/2025    Mckay Fritz  [facility]  PULMMED [address]  Scheduled Appointment: 05/09/2025    [facility]  ELECTROPH [id number]  Scheduled Appointment: 06/17/2025    Cherelle Harvey  [facility]  [facility]  Scheduled Appointment: 06/27/2025     Abdomen soft, non-tender, no guarding. Patricia Monae  [facility] On license of [facility]  PEDALLERGY [address] [facility]  Scheduled Appointment: 04/29/2025    Margo Ash  [facility]  INFDISEASE [address]  Scheduled Appointment: 04/30/2025    [facility]  PULMMED [address]  Scheduled Appointment: 05/01/2025    Mckay Fritz  [facility] Physician On license of [facility]  PULMMED [address]  Scheduled Appointment: 05/09/2025    [facility]  INTMED  [facility]   Scheduled Appointment: 05/13/2025    [facility]   [address]  Scheduled Appointment: 06/03/2025    [facility]  ELECTROPH [id number]  Scheduled Appointment: 06/17/2025    Cherelle Harvey  [facility]  [facility]  Scheduled Appointment: 06/27/2025     Mckay Fritz  [facility]  PULMMED [address]  Scheduled Appointment: 05/09/2025    [facility]  INTMED  [facility]   Scheduled Appointment: 05/13/2025    [facility]   [address]  Scheduled Appointment: 06/03/2025    Vivian Yoon  [facility]  PULMMED [address] [facility]  Scheduled Appointment: 06/06/2025    [facility]  ELECTROPH [id number]  Scheduled Appointment: 06/17/2025    Cherelle Harvey  [facility]  [facility]  Scheduled Appointment: 06/27/2025

## 2025-06-25 ASSESSMENT — KERATOMETRY
OS_K2POWER_DIOPTERS: 52.50
OD_AXISANGLE_DEGREES: 175
METHOD_AUTO_MANUAL: AUTO
OS_AXISANGLE_DEGREES: 025
OD_K2POWER_DIOPTERS: 41.75
OD_K1POWER_DIOPTERS: 41.50
OS_K1POWER_DIOPTERS: 48.75

## 2025-06-25 ASSESSMENT — REFRACTION_MANIFEST
OS_CYLINDER: -2.00
OS_SPHERE: -1.75
OD_AXIS: 090
OS_CYLINDER: -2.50
OD_CYLINDER: -0.50
OS_VA1: 20/50-1
OS_SPHERE: -2.00
OS_AXIS: 90
OD_SPHERE: -0.50
OS_SPHERE: -1.00
OS_AXIS: 139
OD_VA1: 20/20
OD_AXIS: 100
OD_SPHERE: PLANO
OS_AXIS: 150
OD_CYLINDER: -0.50
OS_CYLINDER: -2.50
OS_VA1: 20/80PH20/40
OS_SPHERE: PLANO
OD_SPHERE: -0.50
OD_CYLINDER: -0.50
OD_CYLINDER: -0.50
OS_CYLINDER: -3.00
OS_AXIS: 140
OS_VA1: 20/40+1
OD_VA1: 20/20
OD_SPHERE: -1.25
OD_VA1: 20/20
OS_VA1: 20/40+1
OD_AXIS: 090
OD_AXIS: 088
OD_VA1: 20/20

## 2025-07-26 ENCOUNTER — OFFICE (OUTPATIENT)
Dept: URBAN - METROPOLITAN AREA CLINIC 94 | Facility: CLINIC | Age: 30
Setting detail: OPHTHALMOLOGY
End: 2025-07-26
Payer: COMMERCIAL

## 2025-07-26 DIAGNOSIS — H16.223: ICD-10-CM

## 2025-07-26 PROCEDURE — 92014 COMPRE OPH EXAM EST PT 1/>: CPT | Performed by: OPTOMETRIST

## 2025-07-26 ASSESSMENT — KERATOMETRY
OD_K1POWER_DIOPTERS: 41.25
OS_K1POWER_DIOPTERS: ERR
OD_AXISANGLE_DEGREES: 180
OD_K2POWER_DIOPTERS: 41.50
METHOD_AUTO_MANUAL: AUTO

## 2025-07-26 ASSESSMENT — REFRACTION_MANIFEST
OS_AXIS: 110
OD_AXIS: 100
OS_VA1: 20/40+1
OS_VA1: 20/50-1
OD_SPHERE: -0.50
OS_AXIS: 90
OD_VA1: 20/20
OD_SPHERE: PLANO
OS_AXIS: 140
OS_CYLINDER: -2.50
OD_AXIS: 105
OD_CYLINDER: -0.50
OD_CYLINDER: -0.50
OS_SPHERE: PLANO
OS_VA1: 20/80PH20/40
OS_AXIS: 139
OS_SPHERE: -2.00
OD_SPHERE: -0.50
OS_VA1: 20/40
OS_SPHERE: -1.50
OD_CYLINDER: -0.50
OS_CYLINDER: -3.00
OD_AXIS: 090
OD_CYLINDER: -0.75
OD_CYLINDER: -0.50
OD_AXIS: 088
OS_CYLINDER: -3.00
OS_AXIS: 150
OS_CYLINDER: -2.00
OS_CYLINDER: -2.50
OD_SPHERE: -0.50
OS_SPHERE: -1.00
OS_VA1: 20/40+1
OD_VA1: 20/20
OD_AXIS: 090
OD_VA1: 20/20
OD_SPHERE: -1.25
OD_VA1: 20/20
OD_VA1: 20/20
OS_SPHERE: -1.75

## 2025-07-26 ASSESSMENT — CONFRONTATIONAL VISUAL FIELD TEST (CVF)
OS_FINDINGS: FULL
OD_FINDINGS: FULL

## 2025-07-26 ASSESSMENT — REFRACTION_AUTOREFRACTION
OS_SPHERE: -5.50
OD_SPHERE: -0.50
OD_AXIS: 104
OS_CYLINDER: -3.25
OS_AXIS: 131
OD_CYLINDER: -0.75

## 2025-07-26 ASSESSMENT — SUPERFICIAL PUNCTATE KERATITIS (SPK)
OD_SPK: 1+ 2+
OS_SPK: 1+ 2+

## 2025-07-26 ASSESSMENT — TONOMETRY
OS_IOP_MMHG: 15
OD_IOP_MMHG: 15

## 2025-07-26 ASSESSMENT — VISUAL ACUITY
OD_BCVA: 20/200
OS_BCVA: 20/40